# Patient Record
Sex: MALE | Race: BLACK OR AFRICAN AMERICAN | NOT HISPANIC OR LATINO | URBAN - METROPOLITAN AREA
[De-identification: names, ages, dates, MRNs, and addresses within clinical notes are randomized per-mention and may not be internally consistent; named-entity substitution may affect disease eponyms.]

---

## 2018-09-21 ENCOUNTER — EMERGENCY (EMERGENCY)
Facility: HOSPITAL | Age: 58
LOS: 1 days | End: 2018-09-21
Payer: SELF-PAY

## 2018-09-21 PROCEDURE — 70490 CT SOFT TISSUE NECK W/O DYE: CPT | Mod: 26

## 2018-09-21 PROCEDURE — 71046 X-RAY EXAM CHEST 2 VIEWS: CPT | Mod: 26

## 2018-09-21 PROCEDURE — 99284 EMERGENCY DEPT VISIT MOD MDM: CPT | Mod: 25

## 2018-09-21 PROCEDURE — 99053 MED SERV 10PM-8AM 24 HR FAC: CPT

## 2020-11-09 ENCOUNTER — EMERGENCY (EMERGENCY)
Facility: HOSPITAL | Age: 60
LOS: 1 days | Discharge: HOME | End: 2020-11-09
Attending: EMERGENCY MEDICINE
Payer: OTHER GOVERNMENT

## 2020-11-09 VITALS
SYSTOLIC BLOOD PRESSURE: 166 MMHG | HEIGHT: 75 IN | WEIGHT: 175.05 LBS | RESPIRATION RATE: 18 BRPM | DIASTOLIC BLOOD PRESSURE: 95 MMHG | HEART RATE: 82 BPM | TEMPERATURE: 98 F | OXYGEN SATURATION: 98 %

## 2020-11-09 DIAGNOSIS — F48.9 NONPSYCHOTIC MENTAL DISORDER, UNSPECIFIED: ICD-10-CM

## 2020-11-09 DIAGNOSIS — I10 ESSENTIAL (PRIMARY) HYPERTENSION: ICD-10-CM

## 2020-11-09 DIAGNOSIS — F32.9 MAJOR DEPRESSIVE DISORDER, SINGLE EPISODE, UNSPECIFIED: ICD-10-CM

## 2020-11-09 DIAGNOSIS — R94.31 ABNORMAL ELECTROCARDIOGRAM [ECG] [EKG]: ICD-10-CM

## 2020-11-09 DIAGNOSIS — F14.10 COCAINE ABUSE, UNCOMPLICATED: ICD-10-CM

## 2020-11-09 LAB
ALBUMIN SERPL ELPH-MCNC: 3.4 G/DL — SIGNIFICANT CHANGE UP (ref 3.3–5.2)
ALP SERPL-CCNC: 67 U/L — SIGNIFICANT CHANGE UP (ref 40–120)
ALT FLD-CCNC: 20 U/L — SIGNIFICANT CHANGE UP
AMPHET UR-MCNC: NEGATIVE — SIGNIFICANT CHANGE UP
ANION GAP SERPL CALC-SCNC: 9 MMOL/L — SIGNIFICANT CHANGE UP (ref 5–17)
APAP SERPL-MCNC: <3 UG/ML — LOW (ref 10–26)
APPEARANCE UR: CLEAR — SIGNIFICANT CHANGE UP
AST SERPL-CCNC: 21 U/L — SIGNIFICANT CHANGE UP
BACTERIA # UR AUTO: ABNORMAL
BARBITURATES UR SCN-MCNC: NEGATIVE — SIGNIFICANT CHANGE UP
BASOPHILS # BLD AUTO: 0.05 K/UL — SIGNIFICANT CHANGE UP (ref 0–0.2)
BASOPHILS NFR BLD AUTO: 1 % — SIGNIFICANT CHANGE UP (ref 0–2)
BENZODIAZ UR-MCNC: NEGATIVE — SIGNIFICANT CHANGE UP
BILIRUB SERPL-MCNC: 0.3 MG/DL — LOW (ref 0.4–2)
BILIRUB UR-MCNC: NEGATIVE — SIGNIFICANT CHANGE UP
BUN SERPL-MCNC: 20 MG/DL — SIGNIFICANT CHANGE UP (ref 8–20)
CALCIUM SERPL-MCNC: 8.4 MG/DL — LOW (ref 8.6–10.2)
CHLORIDE SERPL-SCNC: 101 MMOL/L — SIGNIFICANT CHANGE UP (ref 98–107)
CO2 SERPL-SCNC: 27 MMOL/L — SIGNIFICANT CHANGE UP (ref 22–29)
COCAINE METAB.OTHER UR-MCNC: POSITIVE
COLOR SPEC: YELLOW — SIGNIFICANT CHANGE UP
CREAT SERPL-MCNC: 1.61 MG/DL — HIGH (ref 0.5–1.3)
DIFF PNL FLD: NEGATIVE — SIGNIFICANT CHANGE UP
EOSINOPHIL # BLD AUTO: 0.14 K/UL — SIGNIFICANT CHANGE UP (ref 0–0.5)
EOSINOPHIL NFR BLD AUTO: 2.7 % — SIGNIFICANT CHANGE UP (ref 0–6)
EPI CELLS # UR: SIGNIFICANT CHANGE UP
ETHANOL SERPL-MCNC: <10 MG/DL — HIGH (ref 0–9)
GLUCOSE SERPL-MCNC: 97 MG/DL — SIGNIFICANT CHANGE UP (ref 70–99)
GLUCOSE UR QL: NEGATIVE MG/DL — SIGNIFICANT CHANGE UP
HCT VFR BLD CALC: 36.8 % — LOW (ref 39–50)
HGB BLD-MCNC: 11.6 G/DL — LOW (ref 13–17)
IMM GRANULOCYTES NFR BLD AUTO: 0.2 % — SIGNIFICANT CHANGE UP (ref 0–1.5)
KETONES UR-MCNC: NEGATIVE — SIGNIFICANT CHANGE UP
LEUKOCYTE ESTERASE UR-ACNC: NEGATIVE — SIGNIFICANT CHANGE UP
LYMPHOCYTES # BLD AUTO: 1.85 K/UL — SIGNIFICANT CHANGE UP (ref 1–3.3)
LYMPHOCYTES # BLD AUTO: 35.9 % — SIGNIFICANT CHANGE UP (ref 13–44)
MCHC RBC-ENTMCNC: 29.3 PG — SIGNIFICANT CHANGE UP (ref 27–34)
MCHC RBC-ENTMCNC: 31.5 GM/DL — LOW (ref 32–36)
MCV RBC AUTO: 92.9 FL — SIGNIFICANT CHANGE UP (ref 80–100)
METHADONE UR-MCNC: NEGATIVE — SIGNIFICANT CHANGE UP
MONOCYTES # BLD AUTO: 0.47 K/UL — SIGNIFICANT CHANGE UP (ref 0–0.9)
MONOCYTES NFR BLD AUTO: 9.1 % — SIGNIFICANT CHANGE UP (ref 2–14)
NEUTROPHILS # BLD AUTO: 2.64 K/UL — SIGNIFICANT CHANGE UP (ref 1.8–7.4)
NEUTROPHILS NFR BLD AUTO: 51.1 % — SIGNIFICANT CHANGE UP (ref 43–77)
NITRITE UR-MCNC: NEGATIVE — SIGNIFICANT CHANGE UP
OPIATES UR-MCNC: NEGATIVE — SIGNIFICANT CHANGE UP
PCP SPEC-MCNC: SIGNIFICANT CHANGE UP
PCP UR-MCNC: NEGATIVE — SIGNIFICANT CHANGE UP
PH UR: 6.5 — SIGNIFICANT CHANGE UP (ref 5–8)
PLATELET # BLD AUTO: 217 K/UL — SIGNIFICANT CHANGE UP (ref 150–400)
POTASSIUM SERPL-MCNC: 3.9 MMOL/L — SIGNIFICANT CHANGE UP (ref 3.5–5.3)
POTASSIUM SERPL-SCNC: 3.9 MMOL/L — SIGNIFICANT CHANGE UP (ref 3.5–5.3)
PROT SERPL-MCNC: 6.8 G/DL — SIGNIFICANT CHANGE UP (ref 6.6–8.7)
PROT UR-MCNC: 100 MG/DL
RBC # BLD: 3.96 M/UL — LOW (ref 4.2–5.8)
RBC # FLD: 13.1 % — SIGNIFICANT CHANGE UP (ref 10.3–14.5)
RBC CASTS # UR COMP ASSIST: SIGNIFICANT CHANGE UP /HPF (ref 0–4)
SALICYLATES SERPL-MCNC: <0.6 MG/DL — LOW (ref 10–20)
SARS-COV-2 RNA SPEC QL NAA+PROBE: SIGNIFICANT CHANGE UP
SODIUM SERPL-SCNC: 137 MMOL/L — SIGNIFICANT CHANGE UP (ref 135–145)
SP GR SPEC: 1.01 — SIGNIFICANT CHANGE UP (ref 1.01–1.02)
THC UR QL: NEGATIVE — SIGNIFICANT CHANGE UP
TROPONIN T SERPL-MCNC: 0.03 NG/ML — SIGNIFICANT CHANGE UP (ref 0–0.06)
UROBILINOGEN FLD QL: 1 MG/DL
WBC # BLD: 5.16 K/UL — SIGNIFICANT CHANGE UP (ref 3.8–10.5)
WBC # FLD AUTO: 5.16 K/UL — SIGNIFICANT CHANGE UP (ref 3.8–10.5)
WBC UR QL: SIGNIFICANT CHANGE UP

## 2020-11-09 PROCEDURE — 73562 X-RAY EXAM OF KNEE 3: CPT | Mod: 26,LT

## 2020-11-09 PROCEDURE — 90792 PSYCH DIAG EVAL W/MED SRVCS: CPT

## 2020-11-09 PROCEDURE — 99218: CPT

## 2020-11-09 RX ORDER — AMLODIPINE BESYLATE 2.5 MG/1
10 TABLET ORAL DAILY
Refills: 0 | Status: DISCONTINUED | OUTPATIENT
Start: 2020-11-09 | End: 2020-11-14

## 2020-11-09 RX ORDER — HYDRALAZINE HCL 50 MG
10 TABLET ORAL ONCE
Refills: 0 | Status: COMPLETED | OUTPATIENT
Start: 2020-11-09 | End: 2020-11-09

## 2020-11-09 RX ORDER — ASPIRIN/CALCIUM CARB/MAGNESIUM 324 MG
81 TABLET ORAL DAILY
Refills: 0 | Status: DISCONTINUED | OUTPATIENT
Start: 2020-11-09 | End: 2020-11-14

## 2020-11-09 RX ADMIN — Medication 10 MILLIGRAM(S): at 18:28

## 2020-11-09 RX ADMIN — Medication 81 MILLIGRAM(S): at 18:28

## 2020-11-09 RX ADMIN — AMLODIPINE BESYLATE 10 MILLIGRAM(S): 2.5 TABLET ORAL at 18:28
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
## 2020-11-09 NOTE — ED ADULT NURSE NOTE - NSSUHOSCREENINGYN_ED_ALL_ED
pt fell off couch around 10:30p. no vomiting. acting herself. Yes - the patient is able to be screened

## 2020-11-09 NOTE — CONSULT NOTE ADULT - ASSESSMENT
61 y/o male with PMHx of HTN presents to ED c/o suicidal thoughts.   Prior medical care/ psych care at [facility].

## 2020-11-09 NOTE — ED ADULT TRIAGE NOTE - CHIEF COMPLAINT QUOTE
Patient arrived to ED today with c/o suicidal thoughts.  Patient reports that he has been off his psych medication and is having thoughts and needs help.
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
## 2020-11-09 NOTE — ED STATDOCS - OBJECTIVE STATEMENT
61 y/o male with no PMHx presents to ED c/o suicidal thoughts. Patient hasn't taken his mental health medications and has been having suicidal racing thoughts for a few days and states he would love to stay here to get his mind right. Patient states difficulty accepting the death of his mom. Patient has also indulged in crack a few days ago. Denies: cp, palpitations, sob, fever, chills

## 2020-11-09 NOTE — ED CDU PROVIDER INITIAL DAY NOTE - OBJECTIVE STATEMENT
racing thoughts, suicidal thoughts for a few days.. admits noncompliance with medications and used crack cocaine 2 days ago.  Denies specific plan to harm self.  Denies chest pain, sob, palpitations.  Prior medical care/ psych care at [facility].

## 2020-11-09 NOTE — CONSULT NOTE ADULT - SUBJECTIVE AND OBJECTIVE BOX
[facility]                                                        Office: [address] [address]                                                       Telephone: [phone]. Fax:[phone]    CARDIOLOGY Note :  Medical Clearance for Behavioral health admission                                                                                             History obtained by: Patient and medical record   obtained: No    HPI: 61 y/o male with PMHx of HTN presents to ED c/o suicidal thoughts. Patient hasn't taken his mental health medications and has been having suicidal racing thoughts for a few days and states he would love to stay here to get his mind right. Patient states difficulty accepting the death of his mom. Patient has also indulged in crack two days ago. Denies specific plan to harm self.  Denies chest pain, sob, palpitations, fever, or chills    Prior medical care/ psych care at [facility].    REVIEW OF SYMPTOMS: Asymptomatic  Cardiovascular:  No chest pain,  No dyspnea,  No fatigue, No syncope,  No palpitations, No dizziness, No Orthopnea, No Paroxsymal nocturnal dyspnea.  Respiratory:  No Dyspnea, No cough.  Genitourinary:  No dysuria, no hematuria.  Gastrointestinal:  No nausea, no vomiting. No diarrhea.  No abdominal pain.  Neurological: No headache, no dizziness, no slurred speech.  Psychiatric: No agitation.  ALL OTHER REVIEW OF SYSTEMS ARE NEGATIVE.    Allergies  No Known Allergies    CURRENT MEDICATIONS:  amLODIPine   Tablet 10 milliGRAM(s) Oral daily  aspirin  chewable    Home Medications:  amLODIPine   Tablet 10 milliGRAM(s) Oral daily  Hydralazine     Past Medical History  Hypertension, unspecified type    Past Surgical History  No significant past surgical history    FAMILY HISTORY:  No pertinent past family Hx    Social History:  Smoker, alcohol and drugs abuse     Vital Signs Last 24 Hrs  T(C): 36.7 (2020 19:31), Max: 37 (2020 17:43)  T(F): 98 (2020 19:31), Max: 98.6 (2020 17:43)  HR: 70 (2020 19:31) (70 - 82)  BP: 169/93 (2020 19:31) (158/108 - 185/92)  RR: 20 (2020 19:31) (18 - 20)  SpO2: 100% (2020 19:31) (95% - 100%)    PHYSICAL EXAM:  Constitutional: Comfortable . No acute distress.   HEENT: Atraumatic and normcephalic , neck is supple . no JVD.  PEERL   CNS: A&O x3. No focal neurologic deficits.   Respiratory: CTAB. No wheezing, rhonchi or crackles   Cardiovascular: S1S2 RRR. No murmur or rubs  Gastrointestinal: Soft non-tender and non distended . +Bowel sounds.   Extremities: No pitting  edema x 4 extremities  Psychiatric: Calm . no agitation.    LABS:                        11.6   5.16  )-----------( 217      ( 2020 16:18 )             36.8         137  |  101  |  20.0  ----------------------------<  97  3.9   |  27.0  |  1.61<H>    Ca    8.4<L>      2020 16:18    TPro  6.8  /  Alb  3.4  /  TBili  0.3<L>  /  DBili  x   /  AST  21  /  ALT  20  /  AlkPhos  67  -    Urinalysis Basic - ( 2020 16:27 )  Color: Yellow / Appearance: Clear / S.010 / pH: x  Gluc: x / Ketone: Negative  / Bili: Negative / Urobili: 1 mg/dL   Blood: x / Protein: 100 mg/dL / Nitrite: Negative   Leuk Esterase: Negative / RBC: 0-2 /HPF / WBC 0-2   Sq Epi: x / Non Sq Epi: Occasional / Bacteria: Occasional    EKG: inverted T-waves I, II, III,AV F, V4-V6. Sinus rhythm. No ST abnormalities

## 2020-11-09 NOTE — ED ADULT NURSE NOTE - HPI (INCLUDE ILLNESS QUALITY, SEVERITY, DURATION, TIMING, CONTEXT, MODIFYING FACTORS, ASSOCIATED SIGNS AND SYMPTOMS)
Patient comes to  after being medically cleared in main ED. Reports he has not been "doing well". He is a  and receives meds and care from [facility]. States he has been vaguely suicidal lately, no plan or intent. Admits to recently using cocaine (crack), and states he feels like his meds aren't "helping very much". States he lives in [place], denied he is currently homeless despite history of being undomiciled.. Reports he has a court appearance in a couple of days. but that he "can't seem to clear" his mind. Agreed to stay in hospital overnight for further evaluation to determine appropriate disposition.

## 2020-11-09 NOTE — CONSULT NOTE ADULT - PROBLEM SELECTOR RECOMMENDATION 2
Patient noncompliant with medication  Continue Amlodipine and Hydralazine  Monitor BP  Dash diet      Case discussed with Dr Molina

## 2020-11-09 NOTE — ED BEHAVIORAL HEALTH ASSESSMENT NOTE - PATIENT'S CHIEF COMPLAINT
"My head is spinning with racing thoughts...I am not safe to be discharged.  I just need a couple of nights."

## 2020-11-09 NOTE — CONSULT NOTE ADULT - PROBLEM SELECTOR RECOMMENDATION 9
EKG: inverted T-waves I, II, III,AV F, V4-V6. Sinus rhythm. No ST abnormalities EKG: inverted T-waves I, II, III,AV F, V4-V6. Sinus rhythm. LVH strain   Patient currently without angina, dyspnea, syncope, palpitations however history of HTN.  From Cardiology standpoint patient EKG: inverted T-waves I, II, III,AV F, V4-V6. Sinus rhythm. LVH strain   Patient currently without angina, dyspnea, syncope, palpitations however history of HTN. Patient should get an Echo as outpatient.     From Cardiology standpoint patient is medically cleared for behavioral health admission EKG: inverted T-waves I, II, III,AV F, V4-V6. Sinus rhythm. LVH strain   Patient currently without angina, dyspnea, syncope, palpitations however history of HTN. Patient should get an Echo as outpatient.   Pt hemodynamically stable     From Cardiology standpoint patient is medically cleared for behavioral health admission

## 2020-11-09 NOTE — ED STATDOCS - MUSCULOSKELETAL, MLM
range of motion is not limited and there is no muscle tenderness. No pedal edema. left knee effusion with FROM.  No pedal edema.

## 2020-11-09 NOTE — CONSULT NOTE ADULT - ATTENDING COMMENTS
Discussed with NP   Asymptomatic   T wave changes with possible LVH on EKG   outpatient echo   ok from a cardiac standpoint to be admitted to psych unit If indicated

## 2020-11-09 NOTE — ED STATDOCS - PROGRESS NOTE DETAILS
seen by  and will need admission for  treatment.  Release of information sent to [facility] for old ECG.  will place on observation; pending medical clearance.

## 2020-11-09 NOTE — ED BEHAVIORAL HEALTH ASSESSMENT NOTE - DESCRIPTION
HTN, Arthritis , , < 5 yrs, no children,  ICU Vital Signs Last 24 Hrs  T(C): 36.7 (09 Nov 2020 13:31), Max: 36.7 (09 Nov 2020 13:31)  T(F): 98.1 (09 Nov 2020 13:31), Max: 98.1 (09 Nov 2020 13:31)  HR: 82 (09 Nov 2020 13:31) (82 - 82)  BP: 166/95 (09 Nov 2020 13:31) (166/95 - 166/95)  BP(mean): --  ABP: --  ABP(mean): --  RR: 18 (09 Nov 2020 13:31) (18 - 18)  SpO2: 98% (09 Nov 2020 13:31) (98% - 98%)

## 2020-11-10 VITALS
DIASTOLIC BLOOD PRESSURE: 91 MMHG | TEMPERATURE: 98 F | OXYGEN SATURATION: 100 % | RESPIRATION RATE: 18 BRPM | SYSTOLIC BLOOD PRESSURE: 154 MMHG | HEART RATE: 66 BPM

## 2020-11-10 PROCEDURE — 99217: CPT

## 2020-11-10 PROCEDURE — 80053 COMPREHEN METABOLIC PANEL: CPT

## 2020-11-10 PROCEDURE — U0003: CPT

## 2020-11-10 PROCEDURE — 99213 OFFICE O/P EST LOW 20 MIN: CPT

## 2020-11-10 PROCEDURE — G0378: CPT

## 2020-11-10 PROCEDURE — 85025 COMPLETE CBC W/AUTO DIFF WBC: CPT

## 2020-11-10 PROCEDURE — 99284 EMERGENCY DEPT VISIT MOD MDM: CPT | Mod: 25

## 2020-11-10 PROCEDURE — 84484 ASSAY OF TROPONIN QUANT: CPT

## 2020-11-10 PROCEDURE — 36415 COLL VENOUS BLD VENIPUNCTURE: CPT

## 2020-11-10 PROCEDURE — 81001 URINALYSIS AUTO W/SCOPE: CPT

## 2020-11-10 PROCEDURE — 73562 X-RAY EXAM OF KNEE 3: CPT

## 2020-11-10 PROCEDURE — 80307 DRUG TEST PRSMV CHEM ANLYZR: CPT

## 2020-11-10 RX ADMIN — Medication 81 MILLIGRAM(S): at 12:02

## 2020-11-10 RX ADMIN — AMLODIPINE BESYLATE 10 MILLIGRAM(S): 2.5 TABLET ORAL at 05:57

## 2020-11-10 NOTE — ED BEHAVIORAL HEALTH NOTE - BEHAVIORAL HEALTH NOTE
PROGRESS NOTE: 11-10-20 @ 08:43  	  • Reason for Ongoing Consultation: Depression and suicidal thoughts	    ID: [id number] Male with HEALTH ISSUES - PROBLEM Dx:  Hypertension, unspecified type    EKG abnormalities    Deferred condition on axis II    Cocaine abuse    Depressive disorder      INTERVAL DATA:   • Interval Chief Complaint: "I just need to get out of here."  • Interval History: 60 y.o. male with self reported history of PTSD, bipolar depression, and anxiety presented to ED last night c/o suicidal thoughts and worsening depression. Seen by the psychiatric NP provider last night. Upon reassessment this morning, patient is eager to leave, requesting breakfast as well as his medication. He states "I just needed a couple nights to get my composure. I was having unhealthy thoughts." He denies current suicidal or homicidal thoughts/intent/plan. He also denies auditory/visual hallucinations or delusions. Patient states that he used to take psych meds Mirtazapine, Quetiapine, and Prazosin; he stopped taking them several months ago because he "felt he could function without them." Patient expresses desire to see outpatient psychiatric provider for further management of depression. [facility] [place]; next appointment scheduled this 2020.     REVIEW OF SYSTEMS:   • Constitutional Symptoms	No complaints  • Eyes	No complaints  • Ears / Nose / Throat / Mouth	No complaints  • Cardiovascular	No complaints  • Respiratory	No complaints  • Gastrointestinal	No complaints  • Genitourinary	No complaints  • Musculoskeletal	Left knee pain secondary OA  • Skin	No complaints  • Neurological	No complaints  • Psychiatric (see HPI)	See HPI  • Endocrine	No complaints  • Hematologic / Lymphatic	No complaints  • Allergic / Immunologic	No complaints    REVIEW OF VITALS/LABS/IMAGING/INVESTIGATIONS:   • Vital signs reviewed: Yes  • Vital Signs:	    T(C): 36.4 (11-10-20 @ 03:43), Max: 37 (20 @ 17:43)  HR: 60 (11-10-20 @ 05:55) (56 - 82)  BP: 160/92 (11-10-20 @ 05:55) (139/77 - 185/92)  RR: 16 (11-10-20 @ 05:55) (16 - 20)  SpO2: 99% (11-10-20 @ 05:55) (95% - 100%)    • Available labs reviewed: Yes  • Available Lab Results:                           11.6   5.16  )-----------( 217      ( 2020 16:18 )             36.8         137  |  101  |  20.0  ----------------------------<  97  3.9   |  27.0  |  1.61<H>    Ca    8.4<L>      2020 16:18    TPro  6.8  /  Alb  3.4  /  TBili  0.3<L>  /  DBili  x   /  AST  21  /  ALT  20  /  AlkPhos  67  11    LIVER FUNCTIONS - ( 2020 16:18 )  Alb: 3.4 g/dL / Pro: 6.8 g/dL / ALK PHOS: 67 U/L / ALT: 20 U/L / AST: 21 U/L / GGT: x             Urinalysis Basic - ( 2020 16:27 )    Color: Yellow / Appearance: Clear / S.010 / pH: x  Gluc: x / Ketone: Negative  / Bili: Negative / Urobili: 1 mg/dL   Blood: x / Protein: 100 mg/dL / Nitrite: Negative   Leuk Esterase: Negative / RBC: 0-2 /HPF / WBC 0-2   Sq Epi: x / Non Sq Epi: Occasional / Bacteria: Occasional          MEDICATIONS:      PRN Medications: None  • PRN Medications since last evaluation: None	  • PRN Details: NA    Current Medications:   amLODIPine   Tablet 10 milliGRAM(s) Oral daily  aspirin  chewable 81 milliGRAM(s) Oral daily     Medication Side Effects:  • Medication Side Effects or Adverse Reactions (new or ongoing)	None known    MENTAL STATUS EXAM:   • Level of Consciousness	Alert  • General Appearance	Well developed  • Body Habitus	Well nourished  • Hygiene	Fair  • Grooming	Good  • Behavior	Cooperative  • Eye Contact	Good  • Relatedness	Good  • Impulse Control	Normal  • Muscle Tone / Strength	Normal muscle tone/strength  • Abnormal Movements	No abnormal movements  • Gait / Station	Antalgic gait  • Speech Volume	Normal  • Speech Rate	Normal  • Speech Spontaneity	Normal  • Speech Articulation	Normal  • Mood	Normal  • Affect Quality	Euthymic  • Affect Range	Full  • Affect Congruence	Congruent  • Thought Process	Linear  • Thought Associations	Normal  • Thought Content	Unremarkable  • Perceptions	No abnormalities  • Oriented to Time	Yes  • Oriented to Place	Yes  • Oriented to Situation	Yes  • Oriented to Person	Yes  • Attention / Concentration	Normal  • Estimated Intelligence	Average  • Recent Memory	Normal  • Remote Memory	Normal  • Fund of Knowledge	Normal  • Language	No abnormalities noted  • Judgment (regarding everyday events)	Fair  • Insight (regarding psychiatric illness)	Fair    SUICIDALITY:   • Suicidality (Interval)	none known    HOMICIDALITY/AGGRESSION:   • Homicidality/Aggression	none known    DIAGNOSIS DSM-V:    Psychiatric Diagnosis (Corresponds to DSM-IV Axis I, II):   HEALTH ISSUES - PROBLEM Dx:  Hypertension, unspecified type    EKG abnormalities    Deferred condition on axis II    Cocaine abuse    Depressive disorder       Medical Diagnosis (Corresponds to DSM-IV Axis III):  • Axis III	      ASSESSMENT OF CURRENT CONDITION:   Summary (include case differential, formulation and patient response to therapy): 60 y.o. male with history of PTSD, bipolar depression, and anxiety presented to ED last night for worsening depression and suicidality. Upon reassessment this morning, patient is cooperative and coherent. Denies current suicidal or homicidal thoughts/intent/plan, denies psychotic symptoms. Cleared by cardiology due to history of HTN. No psychiatric contraindications to discharge. Discussed with patient a plan to f[facility]u with [facility] in [place] for further management of depression; next appt scheduled 2020.     Risk Assessment (consider static vs modifiable risk factors and protective factors; comment on level of risk for dangerous behavior):  Patient denies current suicidal thoughts/intent/plan. No previous history of suicide attempts or SIB. Low level risk for dangerous behavior.    PLAN    Discharge patient from ED with outpatient psych f[facility] at the [facility] [place] [facility].

## 2020-11-10 NOTE — ED CDU PROVIDER DISPOSITION NOTE - CARE PROVIDER_API CALL
Juan Ramon Knight  ORTHOPAEDIC SURGERY  [address] [place] [address]  Phone: [phone]  Fax: [phone]  Follow Up Time: 4-6 Days

## 2020-11-10 NOTE — PROVIDER CONTACT NOTE (OTHER) - ASSESSMENT
Patient is medically cleared for discharge to home. MARILYNN Downey is requesting a Cardiology follow up appointment for patient. Case management office made aware for assist with the same.

## 2020-11-10 NOTE — ED CDU PROVIDER DISPOSITION NOTE - ATTENDING CONTRIBUTION TO CARE
I, Israel Lee, performed a face to face bedside interview with this patient regarding history of present illness, review of symptoms and relevant past medical, social and family history.  I completed an independent physical examination. I have communicated the patient’s plan of care and disposition with the ACP.      pt cleared by ;  avoid all drugs and alcohol   [facility]

## 2020-11-10 NOTE — ED ADULT NURSE REASSESSMENT NOTE - NS ED NURSE REASSESS COMMENT FT1
patient resting comfortably in stretcher, ambulated to bathroom independently at this time with steady gait. remains in yellow gown. VSS, patient safety maintained, will continue to monitor.
Assumed care of patient at 0720.  Patient resting in bed asleep with no distress noted.  Safety of patient maintained.
Patient ate 100% of his lunch.  Patient awaiting completion of discharge plans with social work.  Denies suicidal or homicidal ideations.  Safety of patient maintained.

## 2020-11-10 NOTE — ED CDU PROVIDER DISPOSITION NOTE - NSFOLLOWUPCLINICS_GEN_ALL_ED_FT
[facility]  Cardiology  [address] [place] [address]  Phone: [phone]  Fax:   Follow Up Time: 4-6 Days

## 2020-11-10 NOTE — ED CDU PROVIDER SUBSEQUENT DAY NOTE - ATTENDING CONTRIBUTION TO CARE
pt placed in observaton for reevaluation by ;  no complaints overnight;  pe awake alert in nad heent ncat neck supple cor s1 [id number] clear abd soft nontender neuor nonfocal

## 2020-11-10 NOTE — ED CDU PROVIDER SUBSEQUENT DAY NOTE - HISTORY
PT placed in OBS, has had no acute incidents or complaints while in CDU PT is stable. PT Placed in OBS for revelation by PSYC in the AM. PT placed in OBS, has had no acute incidents or complaints while in CDU PT is stable. PT Placed in OBS for reevaluation by PSYC in the AM.

## 2020-11-10 NOTE — ED CDU PROVIDER DISPOSITION NOTE - CLINICAL COURSE
Pt with racing thoughts, suicidal thoughts for a few days.. admits noncompliance with medications and used crack cocaine 2 days ago.  Denies specific plan to harm self.  Denies chest pain, sob, palpitations.  Prior medical care/ psych care at [facility]. Pt with abnormal EKG, cleared by cardiology.  Seen and cleared by psych. Discussed all incidental findings and importance of outpatient follow up.  Discussed results, plan and return precautions with patient, pt verbalized understanding and agreement of plan. Given copies of lab/radiology for outpatient follow up.

## 2020-11-10 NOTE — CHART NOTE - NSCHARTNOTEFT_GEN_A_CORE
SW Note: Met with pt to discuss his discharge plans. pt confirmed home address on face sheet, lives alone, apt. Stated he asked a friend to drive him to [facility] for help on 11/9/20. Pt mood was bright. Denied S/I and H/I. reports " feels better" and wants to go home. States [facility] the [facility]. Admits to recent poor compliance with aftercare but states he understands the importance of tx. declined to have SW call and confirm next appt. provided bus tickets and directions to get home. Reports no individuals in the community to outreach for D/C planning.   provided pt with [facility] crisis line and [facility] [place]

## 2020-11-10 NOTE — ED CDU PROVIDER DISPOSITION NOTE - NSFOLLOWUPINSTRUCTIONS_ED_ALL_ED_FT
- Please follow up with your Primary Care Doctor in 24-48 hr.  - Seek immediate medical care for any new, worsening or concerning signs or symptoms.   - You were given copies of all your test results, please bring to your primary care doctor for review of any abnormal test results  - Follow up with Cardiologist listed above, in 1 week for abnormal EKG  - Follow up with Orthopedist for knee pain    Feel better!

## 2020-11-10 NOTE — ED CDU PROVIDER DISPOSITION NOTE - PATIENT PORTAL LINK FT
You can access the FollowMyHealth Patient Portal offered by [facility] by registering at the following website: [email or web address]. By joining [employer]’s FollowMyHealth portal, you will also be able to view your health information using other applications (apps) compatible with our system.

## 2020-11-20 PROBLEM — Z00.00 ENCOUNTER FOR PREVENTIVE HEALTH EXAMINATION: Status: ACTIVE | Noted: 2020-11-20

## 2020-11-23 DIAGNOSIS — F14.90 COCAINE USE, UNSPECIFIED, UNCOMPLICATED: ICD-10-CM

## 2020-11-23 DIAGNOSIS — I10 ESSENTIAL (PRIMARY) HYPERTENSION: ICD-10-CM

## 2020-11-23 DIAGNOSIS — Z56.0 UNEMPLOYMENT, UNSPECIFIED: ICD-10-CM

## 2020-11-23 DIAGNOSIS — Z78.9 OTHER SPECIFIED HEALTH STATUS: ICD-10-CM

## 2020-11-23 DIAGNOSIS — Z63.5 DISRUPTION OF FAMILY BY SEPARATION AND DIVORCE: ICD-10-CM

## 2020-11-23 DIAGNOSIS — F17.200 NICOTINE DEPENDENCE, UNSPECIFIED, UNCOMPLICATED: ICD-10-CM

## 2020-11-23 DIAGNOSIS — B19.20 UNSPECIFIED VIRAL HEPATITIS C W/OUT HEPATIC COMA: ICD-10-CM

## 2020-11-23 RX ORDER — AMLODIPINE BESYLATE 10 MG/1
10 TABLET ORAL
Qty: 30 | Refills: 1 | Status: ACTIVE | COMMUNITY
Start: 2020-11-23

## 2020-11-23 RX ORDER — HYDROCHLOROTHIAZIDE 25 MG/1
25 TABLET ORAL
Qty: 90 | Refills: 2 | Status: ACTIVE | COMMUNITY
Start: 2020-11-23

## 2020-11-23 SDOH — ECONOMIC STABILITY - INCOME SECURITY: UNEMPLOYMENT, UNSPECIFIED: Z56.0

## 2020-11-23 SDOH — SOCIAL STABILITY - SOCIAL INSECURITY: DISRUPTION OF FAMILY BY SEPARATION AND DIVORCE: Z63.5

## 2020-11-25 ENCOUNTER — APPOINTMENT (OUTPATIENT)
Dept: CARDIOLOGY | Facility: CLINIC | Age: 60
End: 2020-11-25

## 2022-03-09 ENCOUNTER — INPATIENT (INPATIENT)
Facility: HOSPITAL | Age: 62
LOS: 6 days | Discharge: HOME | DRG: 917 | End: 2022-03-16
Attending: STUDENT IN AN ORGANIZED HEALTH CARE EDUCATION/TRAINING PROGRAM | Admitting: HOSPITALIST
Payer: OTHER GOVERNMENT

## 2022-03-09 VITALS
WEIGHT: 149.91 LBS | HEIGHT: 75 IN | TEMPERATURE: 97 F | HEART RATE: 80 BPM | OXYGEN SATURATION: 98 % | RESPIRATION RATE: 18 BRPM | SYSTOLIC BLOOD PRESSURE: 231 MMHG | DIASTOLIC BLOOD PRESSURE: 145 MMHG

## 2022-03-09 DIAGNOSIS — R77.8 OTHER SPECIFIED ABNORMALITIES OF PLASMA PROTEINS: ICD-10-CM

## 2022-03-09 DIAGNOSIS — I10 ESSENTIAL (PRIMARY) HYPERTENSION: ICD-10-CM

## 2022-03-09 DIAGNOSIS — F14.921 COCAINE USE, UNSPECIFIED WITH INTOXICATION DELIRIUM: ICD-10-CM

## 2022-03-09 LAB
ALBUMIN SERPL ELPH-MCNC: 4.2 G/DL — SIGNIFICANT CHANGE UP (ref 3.3–5.2)
ALP SERPL-CCNC: 97 U/L — SIGNIFICANT CHANGE UP (ref 40–120)
ALT FLD-CCNC: 24 U/L — SIGNIFICANT CHANGE UP
AMPHET UR-MCNC: NEGATIVE — SIGNIFICANT CHANGE UP
ANION GAP SERPL CALC-SCNC: 11 MMOL/L — SIGNIFICANT CHANGE UP (ref 5–17)
APAP SERPL-MCNC: <3 UG/ML — LOW (ref 10–26)
APPEARANCE UR: CLEAR — SIGNIFICANT CHANGE UP
APTT BLD: 33.6 SEC — SIGNIFICANT CHANGE UP (ref 27.5–35.5)
AST SERPL-CCNC: 43 U/L — HIGH
BACTERIA # UR AUTO: ABNORMAL
BARBITURATES UR SCN-MCNC: NEGATIVE — SIGNIFICANT CHANGE UP
BASOPHILS # BLD AUTO: 0.05 K/UL — SIGNIFICANT CHANGE UP (ref 0–0.2)
BASOPHILS NFR BLD AUTO: 1.3 % — SIGNIFICANT CHANGE UP (ref 0–2)
BENZODIAZ UR-MCNC: NEGATIVE — SIGNIFICANT CHANGE UP
BILIRUB SERPL-MCNC: 0.5 MG/DL — SIGNIFICANT CHANGE UP (ref 0.4–2)
BILIRUB UR-MCNC: NEGATIVE — SIGNIFICANT CHANGE UP
BUN SERPL-MCNC: 28.3 MG/DL — HIGH (ref 8–20)
CALCIUM SERPL-MCNC: 9.2 MG/DL — SIGNIFICANT CHANGE UP (ref 8.6–10.2)
CHLORIDE SERPL-SCNC: 102 MMOL/L — SIGNIFICANT CHANGE UP (ref 98–107)
CO2 SERPL-SCNC: 26 MMOL/L — SIGNIFICANT CHANGE UP (ref 22–29)
COCAINE METAB.OTHER UR-MCNC: POSITIVE
COLOR SPEC: YELLOW — SIGNIFICANT CHANGE UP
CREAT SERPL-MCNC: 2.05 MG/DL — HIGH (ref 0.5–1.3)
DIFF PNL FLD: ABNORMAL
EGFR: 36 ML/MIN/1.73M2 — LOW
EOSINOPHIL # BLD AUTO: 0.08 K/UL — SIGNIFICANT CHANGE UP (ref 0–0.5)
EOSINOPHIL NFR BLD AUTO: 2.1 % — SIGNIFICANT CHANGE UP (ref 0–6)
EPI CELLS # UR: SIGNIFICANT CHANGE UP
ETHANOL SERPL-MCNC: <10 MG/DL — SIGNIFICANT CHANGE UP (ref 0–9)
GLUCOSE SERPL-MCNC: 100 MG/DL — HIGH (ref 70–99)
GLUCOSE UR QL: NEGATIVE MG/DL — SIGNIFICANT CHANGE UP
HCT VFR BLD CALC: 45.1 % — SIGNIFICANT CHANGE UP (ref 39–50)
HGB BLD-MCNC: 13.9 G/DL — SIGNIFICANT CHANGE UP (ref 13–17)
HIV 1 & 2 AB SERPL IA.RAPID: SIGNIFICANT CHANGE UP
IMM GRANULOCYTES NFR BLD AUTO: 0 % — SIGNIFICANT CHANGE UP (ref 0–1.5)
INR BLD: 1.02 RATIO — SIGNIFICANT CHANGE UP (ref 0.88–1.16)
KETONES UR-MCNC: NEGATIVE — SIGNIFICANT CHANGE UP
LEUKOCYTE ESTERASE UR-ACNC: NEGATIVE — SIGNIFICANT CHANGE UP
LYMPHOCYTES # BLD AUTO: 0.99 K/UL — LOW (ref 1–3.3)
LYMPHOCYTES # BLD AUTO: 25.6 % — SIGNIFICANT CHANGE UP (ref 13–44)
MCHC RBC-ENTMCNC: 28.4 PG — SIGNIFICANT CHANGE UP (ref 27–34)
MCHC RBC-ENTMCNC: 30.8 GM/DL — LOW (ref 32–36)
MCV RBC AUTO: 92.2 FL — SIGNIFICANT CHANGE UP (ref 80–100)
METHADONE UR-MCNC: NEGATIVE — SIGNIFICANT CHANGE UP
MONOCYTES # BLD AUTO: 0.25 K/UL — SIGNIFICANT CHANGE UP (ref 0–0.9)
MONOCYTES NFR BLD AUTO: 6.5 % — SIGNIFICANT CHANGE UP (ref 2–14)
NEUTROPHILS # BLD AUTO: 2.5 K/UL — SIGNIFICANT CHANGE UP (ref 1.8–7.4)
NEUTROPHILS NFR BLD AUTO: 64.5 % — SIGNIFICANT CHANGE UP (ref 43–77)
NITRITE UR-MCNC: NEGATIVE — SIGNIFICANT CHANGE UP
OPIATES UR-MCNC: NEGATIVE — SIGNIFICANT CHANGE UP
PCP SPEC-MCNC: SIGNIFICANT CHANGE UP
PCP UR-MCNC: NEGATIVE — SIGNIFICANT CHANGE UP
PH UR: 7 — SIGNIFICANT CHANGE UP (ref 5–8)
PLATELET # BLD AUTO: 216 K/UL — SIGNIFICANT CHANGE UP (ref 150–400)
POTASSIUM SERPL-MCNC: 5.2 MMOL/L — SIGNIFICANT CHANGE UP (ref 3.5–5.3)
POTASSIUM SERPL-SCNC: 5.2 MMOL/L — SIGNIFICANT CHANGE UP (ref 3.5–5.3)
PROT SERPL-MCNC: 8.8 G/DL — HIGH (ref 6.6–8.7)
PROT UR-MCNC: 100
PROTHROM AB SERPL-ACNC: 11.8 SEC — SIGNIFICANT CHANGE UP (ref 10.5–13.4)
RBC # BLD: 4.89 M/UL — SIGNIFICANT CHANGE UP (ref 4.2–5.8)
RBC # FLD: 14.1 % — SIGNIFICANT CHANGE UP (ref 10.3–14.5)
RBC CASTS # UR COMP ASSIST: SIGNIFICANT CHANGE UP /HPF (ref 0–4)
SALICYLATES SERPL-MCNC: <0.6 MG/DL — LOW (ref 10–20)
SARS-COV-2 RNA SPEC QL NAA+PROBE: SIGNIFICANT CHANGE UP
SODIUM SERPL-SCNC: 139 MMOL/L — SIGNIFICANT CHANGE UP (ref 135–145)
SP GR SPEC: 1.01 — SIGNIFICANT CHANGE UP (ref 1.01–1.02)
THC UR QL: POSITIVE
TROPONIN T SERPL-MCNC: 0.06 NG/ML — SIGNIFICANT CHANGE UP (ref 0–0.06)
TROPONIN T SERPL-MCNC: 0.08 NG/ML — HIGH (ref 0–0.06)
UROBILINOGEN FLD QL: NEGATIVE MG/DL — SIGNIFICANT CHANGE UP
WBC # BLD: 3.87 K/UL — SIGNIFICANT CHANGE UP (ref 3.8–10.5)
WBC # FLD AUTO: 3.87 K/UL — SIGNIFICANT CHANGE UP (ref 3.8–10.5)
WBC UR QL: SIGNIFICANT CHANGE UP /HPF (ref 0–5)

## 2022-03-09 PROCEDURE — 99220: CPT

## 2022-03-09 RX ORDER — SODIUM CHLORIDE 9 MG/ML
500 INJECTION INTRAMUSCULAR; INTRAVENOUS; SUBCUTANEOUS ONCE
Refills: 0 | Status: COMPLETED | OUTPATIENT
Start: 2022-03-09 | End: 2022-03-09

## 2022-03-09 RX ORDER — HYDRALAZINE HCL 50 MG
10 TABLET ORAL ONCE
Refills: 0 | Status: COMPLETED | OUTPATIENT
Start: 2022-03-09 | End: 2022-03-09

## 2022-03-09 RX ORDER — SODIUM CHLORIDE 9 MG/ML
1000 INJECTION INTRAMUSCULAR; INTRAVENOUS; SUBCUTANEOUS ONCE
Refills: 0 | Status: DISCONTINUED | OUTPATIENT
Start: 2022-03-09 | End: 2022-03-09

## 2022-03-09 RX ADMIN — SODIUM CHLORIDE 500 MILLILITER(S): 9 INJECTION INTRAMUSCULAR; INTRAVENOUS; SUBCUTANEOUS at 23:00

## 2022-03-09 RX ADMIN — Medication 10 MILLIGRAM(S): at 20:44

## 2022-03-09 RX ADMIN — Medication 1 MILLIGRAM(S): at 14:39

## 2022-03-09 RX ADMIN — SODIUM CHLORIDE 500 MILLILITER(S): 9 INJECTION INTRAMUSCULAR; INTRAVENOUS; SUBCUTANEOUS at 17:44

## 2022-03-09 NOTE — CONSULT NOTE ADULT - PROBLEM SELECTOR RECOMMENDATION 9
.  - patient admitted to cocaine use in past 24H  - Utox + for THC, Cocaine  - troponin elevated 0.08,   - EKG with nonspecific T wave abnormality  - BNP elevated 4482  - continue to trend troponin  - unable to assess for chest pain due to patient's LOC  - pending TTE for evaluation of cardiac function and any valvular abnormalities

## 2022-03-09 NOTE — ED PROVIDER NOTE - ATTENDING CONTRIBUTION TO CARE
61yoM; with PMH signif for PTSD, Bipolar, Anxiety; now p/w not wanting to live 2/2 using drugs too much. states he has no plan, but no longer wants to live because he uses crack too much and is destroying his life. patient states it was snowing outside so he felt like it was a good time to come get help.  denies hi/ah/vh. denies cp/sob/palp. denies cough. denies abd pain. denies n/v/d. denies headache. denies numbness/tingling. denies focal weakness. denies blurry vision. in triage, found to be severely hypertensive.  Gen: Alert, NAD  Head: NC, AT, PERRL, EOMI, normal lids/conjunctiva  Neck: +supple, no tenderness/meningismus/JVD, +Trachea midline  Pulm: Bilateral BS, normal resp effort, no wheeze/stridor/retractions  CV: RRR, no M/R/G, 2+dist pulses  Abd: soft, NT/ND, +BS, no hepatosplenomegaly  Mskel: ROM intact x4 extremities.  no edema/erythema/cyanosis  Skin: no rash, warm, dry  Neuro: AAOx3, no sensory/motor deficits, CN 2-12 intact  A/P: 61yoM p/w hypertensive emergency & passive SI  -Passive SI/Drug abuse: psych eval, sw for sbirt  -Hypertensive urgency: labs, ekg, cardiac monitor, cardiology consult.

## 2022-03-09 NOTE — ED PROVIDER NOTE - CLINICAL SUMMARY MEDICAL DECISION MAKING FREE TEXT BOX
62 yo male with thoughts of suicidality. Found to be hypertensive yesterday pm after smoking cocaine. Will administer 1 mg of Ativan. Pursue cardiac workup and check ED Psych labs. Consult  for further evaluation. Monitor and reassess.

## 2022-03-09 NOTE — CONSULT NOTE ADULT - ATTENDING COMMENTS
Patient received ativan prior to my evaluation, lethargic and unable to provide detailed history    60 y/o M presents with suicidal ideation   +Recent cocaine and marijuana use   EKG shows SR with anterolateral TWI, similar to previous EKG from 11/2020  Troponin 0.08, . Cr 2.05  Trend cardiac enzymes and EKGs   TTE for LVEF, wall motion abnormalities  Further ischemic workup, likely with lexiscan NST, pending patient agreement.   Further management as above
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
## 2022-03-09 NOTE — ED BEHAVIORAL HEALTH ASSESSMENT NOTE - RISK ASSESSMENT
Addended by: Nathan [place] on: 6/26/2020 09:51 AM     Modules accepted: Mick Risk factors: depression, impulsivity, hx of self- injurious behavior, prior suicidality,  prior hospitalizations, positive family hx, hx of substance abuse, multiple stressors,  poor reactivity to stressors, hx of trauma   Protective factors: unable to assess    Based on risk assessment evaluation, Pt DOES NOT appear to be at imminent risk of harm to self or others at this time. Moderate Acute Suicide Risk

## 2022-03-09 NOTE — ED BEHAVIORAL HEALTH ASSESSMENT NOTE - HPI (INCLUDE ILLNESS QUALITY, SEVERITY, DURATION, TIMING, CONTEXT, MODIFYING FACTORS, ASSOCIATED SIGNS AND SYMPTOMS)
Patient is a 61 year old male, , domiciled, non-care giver, presents to [facility] for c/o depression with thoughts of hurting himself. Admits to crack and marijuana use yesterday.    Patient was seen and evaluated, the chart was reviewed, treatment plan discussed with the team. As per nursing no behavioral events/abnormalities, pt is has been asleep. Pt was seen at bedside, upon assessment pt is asleep, lethargic, difficult to arouse, he awakens to painful stimuli, he is unable to keep his eyes open. He is unable to participate in MSE related to lethargy. No overt symptoms of psychosis.

## 2022-03-09 NOTE — ED PROVIDER NOTE - OBJECTIVE STATEMENT
60 yo male history of PTSD, bipolar depression, and anxiety presents stating that he is experiencing thoughts of wanting to end his life. He states that he is tired of his life. Patient does not endorse any specific plan with which to end his life. Patient states that he last snorted cocaine yesterday, he has snorted cocaine in the past. Besides cocaine, patient also uses marijuana. Denies other drug or alcohol use. Denies fever/chills, cough, sore throat, sob, cp, abd pain, n/v/d, urinary symptoms, dizziness, headache, focal weakness/numbness/tingling in extremities.

## 2022-03-09 NOTE — ED PROVIDER NOTE - PHYSICAL EXAMINATION
Gen: NAD.   Head: NC/AT  Neck: trachea midline  Resp:  No distress, lungs cta  Cardiac: Heart RRR. No LE edema. Hypertensive.   Abdomen: Soft, nondistended.   Ext: no deformities  Neuro:  A&O appears non focal  Skin:  Warm and dry as visualized  Psych:  Endorses SI.

## 2022-03-09 NOTE — CONSULT NOTE ADULT - SUBJECTIVE AND OBJECTIVE BOX
[facility]                                              CARDIOLOGY AT [place] [address]                                             Telephone: [phone]. Fax:[phone]                                                       CARDIOLOGY CONSULTATION NOTE                                                                                             History obtained by:  medical record  [facility], later [facility] Cardiologist: unknown    obtained: Yes [  ] No [  x]  Reason for Consultation: elevated troponin  Available out pt records reviewed: Yes [x  ] No [  ]    Chief complaint:    Patient is a 61y old  Male who presents with a chief complaint of suicidal ideation    HPI: 62 y/o M with PMH HTN, PTSD, Bipolar depression, anxiety, Hep C (?) presents to [facility] with suicidal ideation. Patient also reports having used cocaine and marijuana in the past day. Besides cocaine, patient also uses marijuana. Denies other drug or alcohol use. Denies fever/chills, cough, sore throat, sob, cp, abd pain, n/v/d, urinary symptoms, dizziness, headache, focal weakness/numbness/tingling in extremities. HPI obtained from records due to patient lethargy after administration of ativan.    CARDIAC TESTING   ECHO:  STRESS:  CATH:   ELECTROPHYSIOLOGY:     PAST MEDICAL HISTORY  No pertinent past medical history  Hypertension, unspecified type    PAST SURGICAL HISTORY  No significant past surgical history    SOCIAL HISTORY:   CIGARETTES:   n/a  ALCOHOL: n/a  DRUGS: cocaine, marijuana use    FAMILY HISTORY:  No pertinent family history in first degree relatives      Family History of Cardiovascular Disease:  unknown  Coronary Artery Disease in first degree relative: unknown  Sudden Cardiac Death in First degree relative: unknown    HOME MEDICATIONS:    CURRENT CARDIAC MEDICATIONS:  Amlodipine 10mg PO daily  hydrochlorothiazide 25mg PO daily    CURRENT OTHER MEDICATIONS:  sodium chloride 0.9% Bolus 500 milliLiter(s) IV Bolus once, Stop order after: 1 Doses      ALLERGIES:   No Known Allergies      REVIEW OF SYMPTOMS:   Patient is lethargic after ativan administration and  cannot participate in exam.      VITAL SIGNS:  T(C): 36.1 (22 @ 13:33), Max: 36.1 (22 @ 13:33)  T(F): 97 (22 @ 13:33), Max: 97 (22 @ 13:33)  HR: 88 (22 @ 15:08) (80 - 88)  BP: 218/101 (22 @ 15:08) (218/101 - 231/145)  RR: 18 (22 @ 13:33) (18 - 18)  SpO2: 98% (22 @ 13:33) (98% - 98%)    INTAKE AND OUTPUT:       PHYSICAL EXAM:  Constitutional: Comfortable . No acute distress.   HEENT: Atraumatic and normocephalic , neck is supple . no JVD. No carotid bruit.  CNS: A&Ox3. No focal deficits.   Respiratory: CTAB, unlabored   Cardiovascular: RRR normal s1 s2. No murmur. No rubs or gallop.  Gastrointestinal: Soft, non-tender. +Bowel sounds.   Extremities: 2+ Peripheral Pulses, No clubbing, cyanosis, or edema  Psychiatric: Calm . no agitation.   Skin: Warm and dry, no ulcers on extremities     LABS:  ( 09 Mar 2022 14:42 )  Troponin T  0.08<H>,  CPK  538<H>, CKMB  X    , BNP 4882<H>                              13.9   3.87  )-----------( 216      ( 09 Mar 2022 14:42 )             45.1     03-    139  |  102  |  28.3<H>  ----------------------------<  100<H>  5.2   |  26.0  |  2.05<H>    Ca    9.2      09 Mar 2022 14:42  Mg     2.2     -    TPro  8.8<H>  /  Alb  4.2  /  TBili  0.5  /  DBili  x   /  AST  43<H>  /  ALT  24  /  AlkPhos  97  03-09    PT/INR - ( 09 Mar 2022 14:42 )   PT: 11.8 sec;   INR: 1.02 ratio         PTT - ( 09 Mar 2022 14:42 )  PTT:33.6 sec  Urinalysis Basic - ( 09 Mar 2022 15:06 )    Color: Yellow / Appearance: Clear / S.010 / pH: x  Gluc: x / Ketone: Negative  / Bili: Negative / Urobili: Negative mg/dL   Blood: x / Protein: 100 / Nitrite: Negative   Leuk Esterase: Negative / RBC: 0-2 /HPF / WBC 0-2 /HPF   Sq Epi: x / Non Sq Epi: Occasional / Bacteria: Occasional    INTERPRETATION OF TELEMETRY:     ECG: NSR with nonspecific T wave abnormality  Prior ECG: Yes [x ] No [  ]    RADIOLOGY & ADDITIONAL STUDIES:    X-ray:    CT scan:   MRI:   US:

## 2022-03-09 NOTE — CONSULT NOTE ADULT - PROBLEM SELECTOR RECOMMENDATION 2
.  - SBP on admission in 200s  - given ativan for delirium and HTN  - restart amlodipine 10mg PO daily  - hold HCTZ for Cr > 2

## 2022-03-09 NOTE — ED BEHAVIORAL HEALTH ASSESSMENT NOTE - DESCRIPTION
ICU Vital Signs Last 24 Hrs  T(C): 36.1 (09 Mar 2022 13:33), Max: 36.1 (09 Mar 2022 13:33)  T(F): 97 (09 Mar 2022 13:33), Max: 97 (09 Mar 2022 13:33)  HR: 88 (09 Mar 2022 15:08) (80 - 88)  BP: 218/101 (09 Mar 2022 15:08) (218/101 - 231/145)  RR: 18 (09 Mar 2022 13:33) (18 - 18)  SpO2: 98% (09 Mar 2022 13:33) (98% - 98%)  03-09    139  |  102  |  28.3<H>  ----------------------------<  100<H>  5.2   |  26.0  |  2.05<H>    Ca    9.2      09 Mar 2022 14:42  Mg     2.2     03-09    TPro  8.8<H>  /  Alb  4.2  /  TBili  0.5  /  DBili  x   /  AST  43<H>  /  ALT  24  /  AlkPhos  97  03-09  Cocaine Metabolite, Urine: Positive (03.09.22 @ 15:05)  THC, Urine Qualitative: Positive (03.09.22 @ 15:05) , , < 5 yrs, no children,  HTN, Arthritis

## 2022-03-09 NOTE — ED ADULT TRIAGE NOTE - NS_BH TRG QUESTION4_ED_ALL_ED
Patient resting calmly, no complaints, no distress. Continued video monitoring in place, sitter at bedside. No

## 2022-03-09 NOTE — ED CDU PROVIDER INITIAL DAY NOTE - ATTENDING CONTRIBUTION TO CARE
61yoM; with PMH signif for PTSD, Bipolar, Anxiety; now p/w not wanting to live 2/2 using drugs too much. states he has no plan, but no longer wants to live because he uses crack too much and is destroying his life. patient states it was snowing outside so he felt like it was a good time to come get help.  denies hi/ah/vh. denies cp/sob/palp. denies cough. denies abd pain. denies n/v/d. denies headache. denies numbness/tingling. denies focal weakness. denies blurry vision. in triage, found to be severely hypertensive.  Gen: Alert, NAD  Head: NC, AT, PERRL, EOMI, normal lids/conjunctiva  Neck: +supple, no tenderness/meningismus/JVD, +Trachea midline  Pulm: Bilateral BS, normal resp effort, no wheeze/stridor/retractions  CV: RRR, no M/R/G, 2+dist pulses  Abd: soft, NT/ND, +BS, no hepatosplenomegaly  Mskel: ROM intact x4 extremities.  no edema/erythema/cyanosis  Skin: no rash, warm, dry  Neuro: AAOx3, no sensory/motor deficits, CN 2-12 intact  A/P: 61yoM p/w hypertensive emergency & passive SI  -Passive SI/Drug abuse: psych re-eval in AM, sw for sbirt  -Hypertensive urgency: echo, nuclear stress

## 2022-03-09 NOTE — ED PROVIDER NOTE - PROGRESS NOTE DETAILS
Darrell: Troponin trending downward from 0.08 to 0.06. Will trend Troponin, Echo and Nuclear stress test per cardiology. Hydralazine for BP control. Plan for virtual observation.

## 2022-03-09 NOTE — CONSULT NOTE ADULT - ASSESSMENT
62 y/o M with PMH HTN, PTSD, Bipolar depression, anxiety, Hep C (?) presents to [facility] with suicidal ideation. Patient also reports having used cocaine and marijuana in the past day. Besides cocaine, patient also uses marijuana. Denies other drug or alcohol use. Denies fever/chills, cough, sore throat, sob, cp, abd pain, n/v/d, urinary symptoms, dizziness, headache, focal weakness/numbness/tingling in extremities. HPI obtained from records due to patient lethargy after administration of ativan.

## 2022-03-09 NOTE — ED ADULT TRIAGE NOTE - CHIEF COMPLAINT QUOTE
PT presents to ED for depression.  States he has thoughts of hurting himself but denies plan. Admits to crack and marijuana use yesterday. Denies drugs or ETOH today

## 2022-03-09 NOTE — ED CDU PROVIDER INITIAL DAY NOTE - MEDICAL DECISION MAKING DETAILS
60 yo male with thoughts of suicidality. Found to be hypertensive yesterday pm after smoking cocaine. Initial Troponin .08, now downtrending to .06. Cardiology saw patient, patient is pending echo and stress test. Patient pending  evaluation at this time for depression/suicidality.

## 2022-03-10 PROCEDURE — 99226: CPT

## 2022-03-10 RX ORDER — HYDRALAZINE HCL 50 MG
10 TABLET ORAL ONCE
Refills: 0 | Status: COMPLETED | OUTPATIENT
Start: 2022-03-10 | End: 2022-03-10

## 2022-03-10 RX ORDER — HYDRALAZINE HCL 50 MG
25 TABLET ORAL ONCE
Refills: 0 | Status: COMPLETED | OUTPATIENT
Start: 2022-03-10 | End: 2022-03-10

## 2022-03-10 RX ORDER — AMLODIPINE BESYLATE 2.5 MG/1
10 TABLET ORAL DAILY
Refills: 0 | Status: DISCONTINUED | OUTPATIENT
Start: 2022-03-10 | End: 2022-03-16

## 2022-03-10 RX ORDER — HYDRALAZINE HCL 50 MG
25 TABLET ORAL
Refills: 0 | Status: DISCONTINUED | OUTPATIENT
Start: 2022-03-10 | End: 2022-03-11

## 2022-03-10 RX ADMIN — AMLODIPINE BESYLATE 10 MILLIGRAM(S): 2.5 TABLET ORAL at 13:19

## 2022-03-10 RX ADMIN — Medication 10 MILLIGRAM(S): at 03:28

## 2022-03-10 RX ADMIN — Medication 25 MILLIGRAM(S): at 08:35

## 2022-03-10 RX ADMIN — Medication 25 MILLIGRAM(S): at 18:50

## 2022-03-10 NOTE — PROGRESS NOTE ADULT - ASSESSMENT
62 yo male history of PTSD, bipolar depression, and anxiety presents stating that he is experiencing thoughts of wanting to end his life. He states that he is tired of his life. Patient does not endorse any specific plan with which to end his life. Patient states that he last snorted cocaine yesterday, he has snorted cocaine in the past. Besides cocaine, patient also uses marijuana. Denies other drug or alcohol use. Denies fever/chills, cough, sore throat, sob, cp, abd pain, n/v/d, urinary symptoms, dizziness, headache, focal weakness/numbness/tingling in extremities. Pt found to have increased troponin, which is now down trending and also has EKG with T wave changes. He refused stress test today, he states his reasoning is "the test is too long", but is agreeable to have the test done tomorrow.

## 2022-03-10 NOTE — PROGRESS NOTE ADULT - PROBLEM SELECTOR PLAN 2
- Troponin down trending  - Denies CP or SOB  - Pt came to ED yesterday with cocaine intoxication- BB deferred   - Scheduled for stress test tomorrow 1st case  - Pt had runs of NSVT, longest run 15 beats, remains in NSR  - Keep K >4, Mg >2 to for arrhythmia ppx  -Continue telemetry monitoring - Troponin down trending  - Denies CP or SOB  - TTE performed, pending read  - Pt came to ED yesterday with cocaine intoxication- BB deferred   - Scheduled for stress test tomorrow 1st case  - Pt had runs of NSVT, longest run 15 beats, remains in NSR  - Keep K >4, Mg >2 to for arrhythmia ppx  -Continue telemetry monitoring

## 2022-03-10 NOTE — ED BEHAVIORAL HEALTH NOTE - BEHAVIORAL HEALTH NOTE
PROGRESS NOTE: 03-10-22 @ 10:26  	  • Reason for Ongoing Consultation: Depression	    ID: 61yyo Male with HEALTH ISSUES - PROBLEM Dx:  Intoxication with cocaine, with delirium  Hypertension, unspecified type  Elevated troponin  Marijuana use         INTERVAL DATA:   • Interval Chief Complaint: "I want to eat"  • Interval History:   Chart reviewed and patient seen. Patient extremely uncooperative and refused to speak with me. Was preoccupied with getting to eat, which nurse is aware of. Unable to assess at this time.     REVIEW OF SYSTEMS:   • Constitutional Symptoms	No complaints  • Eyes	No complaints  • Ears / Nose / Throat / Mouth	No complaints  • Cardiovascular	No complaints  • Respiratory	No complaints  • Gastrointestinal	No complaints  • Genitourinary	No complaints  • Musculoskeletal	No complaints  • Skin	No complaints  • Neurological	No complaints  • Psychiatric (see HPI)	See HPI  • Endocrine	No complaints  • Hematologic / Lymphatic	No complaints  • Allergic / Immunologic	No complaints    REVIEW OF VITALS/LABS/IMAGING/INVESTIGATIONS:   • Vital signs reviewed: Yes  • Vital Signs:	    T(C): 36.9 (22 @ 23:19), Max: 36.9 (22 @ 23:19)  HR: 74 (03-10-22 @ 06:33) (64 - 88)  BP: 162/96 (03-10-22 @ 06:33) (160/96 - 231/145)  RR: 15 (03-10-22 @ 04:03) (15 - 18)  SpO2: 98% (03-10-22 @ 04:03) (98% - 100%)    • Available labs reviewed: Yes  • Available Lab Results:                           13.9   3.87  )-----------( 216      ( 09 Mar 2022 14:42 )             45.1         139  |  102  |  28.3<H>  ----------------------------<  100<H>  5.2   |  26.0  |  2.05<H>    Ca    9.2      09 Mar 2022 14:42  Mg     2.2         TPro  8.8<H>  /  Alb  4.2  /  TBili  0.5  /  DBili  x   /  AST  43<H>  /  ALT  24  /  AlkPhos  97      LIVER FUNCTIONS - ( 09 Mar 2022 14:42 )  Alb: 4.2 g/dL / Pro: 8.8 g/dL / ALK PHOS: 97 U/L / ALT: 24 U/L / AST: 43 U/L / GGT: x           PT/INR - ( 09 Mar 2022 14:42 )   PT: 11.8 sec;   INR: 1.02 ratio         PTT - ( 09 Mar 2022 14:42 )  PTT:33.6 sec  Urinalysis Basic - ( 09 Mar 2022 15:06 )    Color: Yellow / Appearance: Clear / S.010 / pH: x  Gluc: x / Ketone: Negative  / Bili: Negative / Urobili: Negative mg/dL   Blood: x / Protein: 100 / Nitrite: Negative   Leuk Esterase: Negative / RBC: 0-2 /HPF / WBC 0-2 /HPF   Sq Epi: x / Non Sq Epi: Occasional / Bacteria: Occasional    Cocaine Metabolite, Urine: Positive (22 @ 15:05)  THC, Urine Qualitative: Positive (22 @ 15:05)  Methadone, Urine: Negative (22 @ 15:05)  Opiate, Urine: Negative (22 @ 15:05)  Phencyclidine Level, Urine: Negative (22 @ 15:05)  Amphetamine, Urine: Negative (22 @ 15:05)  Barbiturates Screen, Urine: Negative (22 @ 15:05)  Benzodiazepine, Urine: Negative (22 @ 15:05)        MEDICATIONS:      PRN Medications: none  • PRN Medications since last evaluation	  • PRN Details	    Current Medications:   Past Pysch Meds: Seroquel, Prazosin, Remeron per previous chart      Medication Side Effects:  • Medication Side Effects or Adverse Reactions (new or ongoing)	None known    MENTAL STATUS EXAM:   • Level of Consciousness	Alert  • General Appearance	No deformities present   • Body Habitus	Average build  • Hygiene	Fair  • Grooming	Fair  • Behavior	Uncooperative  • Eye Contact	Poor  • Relatedness	Poor  • Impulse Control	Normal  • Muscle Tone / Strength	Normal muscle tone/strength  • Abnormal Movements	No abnormal movements  • Gait / Station	Normal gait / station  • Speech Volume	Normal  • Speech Rate	Normal  • Speech Spontaneity	Normal  • Speech Articulation	Normal  • Mood	Irritable   • Affect Quality	Irritable   • Affect Range	Constricted   • Affect Congruence	Congruent  • Thought Process	              Linear  • Thought Associations	Normal  • Thought Content	Preoccupied with getting food. Refused interview for further assessment   • Perceptions	No abnormalities  • Oriented to Time	Yes  • Oriented to Place	Yes  • Oriented to Situation	Yes  • Oriented to Person	Yes  • Attention / Concentration	Impaired   • Estimated Intelligence	Average  • Recent Memory	Unable to assess  • Remote Memory	Unable to assess   • Fund of Knowledge	Normal  • Language	No abnormalities noted  • Judgment (regarding everyday events)	Fair  • Insight (regarding psychiatric illness)	Fair    SUICIDALITY:   • Suicidality (Interval)	none known    HOMICIDALITY/AGGRESSION:   • Homicidality/Aggression	none known    DIAGNOSIS DSM-V:    Psychiatric Diagnosis (Corresponds to DSM-IV Axis I, II):   HEALTH ISSUES - PROBLEM Dx:  Intoxication with cocaine, with delirium  Hypertension, unspecified type  Elevated troponin  Marijuana use              Medical Diagnosis (Corresponds to DSM-IV Axis III):  • Axis III	  HTN    ASSESSMENT OF CURRENT CONDITION:   Summary (include case differential, formulation and patient response to therapy):   61 year old male with PMHx of bipolar depression, PTSD, anxiety, [facility], [facility] [place] with no prior SA or SIB, history of cocaine abuse and marijuana use presented for worsening depression and SI admitting to crack/cocaine use. Patient refused to speak with me at this time, unable to assess for current SI. Will need reassessment.     Risk Assessment (consider static vs modifiable risk factors and protective factors; comment on level of risk for dangerous behavior): Unable to assess    PLAN  Needs reassessment

## 2022-03-10 NOTE — ED CDU PROVIDER SUBSEQUENT DAY NOTE - MEDICAL DECISION MAKING DETAILS
po dose of Hydralazine ordered and pt to be re-evaluated by psych this AM po dose of Hydralazine ordered and pt to have nuclear stress test today and to be re-evaluated by psych this AM

## 2022-03-11 DIAGNOSIS — F31.9 BIPOLAR DISORDER, UNSPECIFIED: ICD-10-CM

## 2022-03-11 DIAGNOSIS — I10 ESSENTIAL (PRIMARY) HYPERTENSION: Chronic | ICD-10-CM

## 2022-03-11 DIAGNOSIS — F19.10 OTHER PSYCHOACTIVE SUBSTANCE ABUSE, UNCOMPLICATED: ICD-10-CM

## 2022-03-11 DIAGNOSIS — I21.4 NON-ST ELEVATION (NSTEMI) MYOCARDIAL INFARCTION: ICD-10-CM

## 2022-03-11 DIAGNOSIS — I16.0 HYPERTENSIVE URGENCY: ICD-10-CM

## 2022-03-11 LAB
ANION GAP SERPL CALC-SCNC: 11 MMOL/L — SIGNIFICANT CHANGE UP (ref 5–17)
APTT BLD: 62.9 SEC — HIGH (ref 27.5–35.5)
BUN SERPL-MCNC: 28.4 MG/DL — HIGH (ref 8–20)
CALCIUM SERPL-MCNC: 8.7 MG/DL — SIGNIFICANT CHANGE UP (ref 8.6–10.2)
CHLORIDE SERPL-SCNC: 103 MMOL/L — SIGNIFICANT CHANGE UP (ref 98–107)
CO2 SERPL-SCNC: 25 MMOL/L — SIGNIFICANT CHANGE UP (ref 22–29)
CREAT SERPL-MCNC: 1.75 MG/DL — HIGH (ref 0.5–1.3)
EGFR: 43 ML/MIN/1.73M2 — LOW
GLUCOSE SERPL-MCNC: 88 MG/DL — SIGNIFICANT CHANGE UP (ref 70–99)
HCT VFR BLD CALC: 39.6 % — SIGNIFICANT CHANGE UP (ref 39–50)
HGB BLD-MCNC: 12.2 G/DL — LOW (ref 13–17)
MAGNESIUM SERPL-MCNC: 2 MG/DL — SIGNIFICANT CHANGE UP (ref 1.6–2.6)
MCHC RBC-ENTMCNC: 28.8 PG — SIGNIFICANT CHANGE UP (ref 27–34)
MCHC RBC-ENTMCNC: 30.8 GM/DL — LOW (ref 32–36)
MCV RBC AUTO: 93.4 FL — SIGNIFICANT CHANGE UP (ref 80–100)
PLATELET # BLD AUTO: 189 K/UL — SIGNIFICANT CHANGE UP (ref 150–400)
POTASSIUM SERPL-MCNC: 4.3 MMOL/L — SIGNIFICANT CHANGE UP (ref 3.5–5.3)
POTASSIUM SERPL-SCNC: 4.3 MMOL/L — SIGNIFICANT CHANGE UP (ref 3.5–5.3)
RBC # BLD: 4.24 M/UL — SIGNIFICANT CHANGE UP (ref 4.2–5.8)
RBC # FLD: 14.5 % — SIGNIFICANT CHANGE UP (ref 10.3–14.5)
SODIUM SERPL-SCNC: 139 MMOL/L — SIGNIFICANT CHANGE UP (ref 135–145)
TROPONIN T SERPL-MCNC: 0.4 NG/ML — HIGH (ref 0–0.06)
TROPONIN T SERPL-MCNC: 0.47 NG/ML — HIGH (ref 0–0.06)
WBC # BLD: 6.03 K/UL — SIGNIFICANT CHANGE UP (ref 3.8–10.5)
WBC # FLD AUTO: 6.03 K/UL — SIGNIFICANT CHANGE UP (ref 3.8–10.5)

## 2022-03-11 PROCEDURE — 99217: CPT

## 2022-03-11 PROCEDURE — 78452 HT MUSCLE IMAGE SPECT MULT: CPT | Mod: 26

## 2022-03-11 PROCEDURE — 99223 1ST HOSP IP/OBS HIGH 75: CPT

## 2022-03-11 PROCEDURE — 93018 CV STRESS TEST I&R ONLY: CPT

## 2022-03-11 PROCEDURE — 93010 ELECTROCARDIOGRAM REPORT: CPT

## 2022-03-11 PROCEDURE — 93016 CV STRESS TEST SUPVJ ONLY: CPT

## 2022-03-11 RX ORDER — HYDRALAZINE HCL 50 MG
50 TABLET ORAL
Refills: 0 | Status: DISCONTINUED | OUTPATIENT
Start: 2022-03-11 | End: 2022-03-13

## 2022-03-11 RX ORDER — HEPARIN SODIUM 5000 [USP'U]/ML
4100 INJECTION INTRAVENOUS; SUBCUTANEOUS ONCE
Refills: 0 | Status: COMPLETED | OUTPATIENT
Start: 2022-03-11 | End: 2022-03-11

## 2022-03-11 RX ORDER — HYDRALAZINE HCL 50 MG
10 TABLET ORAL
Refills: 0 | Status: DISCONTINUED | OUTPATIENT
Start: 2022-03-11 | End: 2022-03-15

## 2022-03-11 RX ORDER — CLOPIDOGREL BISULFATE 75 MG/1
75 TABLET, FILM COATED ORAL DAILY
Refills: 0 | Status: DISCONTINUED | OUTPATIENT
Start: 2022-03-11 | End: 2022-03-14

## 2022-03-11 RX ORDER — HEPARIN SODIUM 5000 [USP'U]/ML
INJECTION INTRAVENOUS; SUBCUTANEOUS
Qty: 25000 | Refills: 0 | Status: DISCONTINUED | OUTPATIENT
Start: 2022-03-11 | End: 2022-03-14

## 2022-03-11 RX ORDER — HEPARIN SODIUM 5000 [USP'U]/ML
4100 INJECTION INTRAVENOUS; SUBCUTANEOUS EVERY 6 HOURS
Refills: 0 | Status: DISCONTINUED | OUTPATIENT
Start: 2022-03-11 | End: 2022-03-14

## 2022-03-11 RX ORDER — ASPIRIN/CALCIUM CARB/MAGNESIUM 324 MG
81 TABLET ORAL DAILY
Refills: 0 | Status: DISCONTINUED | OUTPATIENT
Start: 2022-03-11 | End: 2022-03-16

## 2022-03-11 RX ORDER — HYDRALAZINE HCL 50 MG
25 TABLET ORAL ONCE
Refills: 0 | Status: COMPLETED | OUTPATIENT
Start: 2022-03-11 | End: 2022-03-11

## 2022-03-11 RX ORDER — HYDRALAZINE HCL 50 MG
5 TABLET ORAL ONCE
Refills: 0 | Status: DISCONTINUED | OUTPATIENT
Start: 2022-03-11 | End: 2022-03-11

## 2022-03-11 RX ORDER — ISOSORBIDE MONONITRATE 60 MG/1
60 TABLET, EXTENDED RELEASE ORAL DAILY
Refills: 0 | Status: DISCONTINUED | OUTPATIENT
Start: 2022-03-11 | End: 2022-03-16

## 2022-03-11 RX ADMIN — ISOSORBIDE MONONITRATE 60 MILLIGRAM(S): 60 TABLET, EXTENDED RELEASE ORAL at 16:34

## 2022-03-11 RX ADMIN — Medication 25 MILLIGRAM(S): at 10:11

## 2022-03-11 RX ADMIN — Medication 25 MILLIGRAM(S): at 11:45

## 2022-03-11 RX ADMIN — AMLODIPINE BESYLATE 10 MILLIGRAM(S): 2.5 TABLET ORAL at 10:10

## 2022-03-11 RX ADMIN — Medication 10 MILLIGRAM(S): at 20:45

## 2022-03-11 RX ADMIN — HEPARIN SODIUM 800 UNIT(S)/HR: 5000 INJECTION INTRAVENOUS; SUBCUTANEOUS at 21:57

## 2022-03-11 RX ADMIN — Medication 0.1 MILLIGRAM(S): at 22:14

## 2022-03-11 RX ADMIN — Medication 50 MILLIGRAM(S): at 17:54

## 2022-03-11 RX ADMIN — HEPARIN SODIUM 800 UNIT(S)/HR: 5000 INJECTION INTRAVENOUS; SUBCUTANEOUS at 16:06

## 2022-03-11 RX ADMIN — HEPARIN SODIUM 4100 UNIT(S): 5000 INJECTION INTRAVENOUS; SUBCUTANEOUS at 16:05

## 2022-03-11 NOTE — H&P ADULT - ASSESSMENT
61 year old male with PMH HTN, 'heart problems', Depression, polysubstance abuse presented to [facility] seeking help for his drug addiction and he is ready to stop. Also states he's depressed, though denies any suicidal or homicidal ideation. In ER patient with elevated BP >200/100, Positive Troponin, Elevated SCr and toxicology screen positive for cocaine and THC. TTE with decreased EF 40-45%, Grade II DD and decreased RVSF. NST also positive and patient now being recommended admission for cardiac catheterization.        NSTEMI, in setting of crack cocaine abuse with Hypertensive urgency  - Admit to telemetry  - DAPT, Statin, Heparin  - Amlodipine, Hydralazine and Nitrate started in OBS, will continue  - Add Clonidine  - LDL, A1c  - Cardiology fo cardiac catheterization on 3/14/22. Patient currently amenable to admission and angiogram    BRIANNA vs CKD. Interval improvement in renal functions  - I/O.  - US KUB  - Monitor renal functions  - Avoid Nephrotoxins    Depression  - Constant observation  -  follow up    Polysubstance abuse  - SW    VTEp - Heparin
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
## 2022-03-11 NOTE — PROGRESS NOTE ADULT - ASSESSMENT
60 yo male history of PTSD, bipolar depression, and anxiety presents stating that he is experiencing thoughts of wanting to end his life. He states that he is tired of his life. Patient does not endorse any specific plan with which to end his life. Patient states that he last snorted cocaine yesterday, he has snorted cocaine in the past. Besides cocaine, patient also uses marijuana. Denies other drug or alcohol use. Denies fever/chills, cough, sore throat, sob, cp, abd pain, n/v/d, urinary symptoms, dizziness, headache, focal weakness/numbness/tingling in extremities. Pt found to have increased troponin, which is now down trending and also has EKG with T wave changes.     He remains in the ED. NM stress performed today, pending read.

## 2022-03-11 NOTE — ED ADULT NURSE NOTE - OBJECTIVE STATEMENT
AAOx3 c/o SOB. Hx of recent pacemaker. Denies trauma, fall, NVD, CP. On Bipap. Awaiting further provider orders. Will continue to assess AAOx3 c/o feeling thoughts of hurting self. No injuries noted. 1:1 maintained.

## 2022-03-11 NOTE — ED CDU PROVIDER SUBSEQUENT DAY NOTE - PHYSICAL EXAMINATION
Gen: NAD.   	Head: NC/AT  	Neck: trachea midline  	Resp:  No distress, lungs cta  	Cardiac: Heart RRR. No LE edema. Hypertensive.   	Abdomen: Soft, nondistended.   	Ext: no deformities  	Neuro:  A&O appears non focal  	Skin:  Warm and dry as visualized  Psych:  Endorses SI.
Gen: NAD.   	Head: NC/AT  	Neck: trachea midline  	Resp:  No distress, lungs cta  	Cardiac: Heart RRR. No LE edema. Hypertensive.   	Abdomen: Soft, nondistended.   	Ext: no deformities  	Neuro:  A&O appears non focal  	Skin:  Warm and dry as visualized  Psych:  Endorses SI.

## 2022-03-11 NOTE — CHART NOTE - NSCHARTNOTEFT_GEN_A_CORE
patient with up trending troponin .08->.04->0.47  Patient now with abnormal NM Stress.   Will start patient on:  ASA 81mg  Plavix 75mg  Heparin GTT  Isosorbide 30mg  continue Hydralazine for HTN.   plan for LHC on Monday if patient is agreeable.   Plan d/w Dr. Yu and Dr. Frausto        IMPRESSIONS: Abnormal Study  * Review of raw data shows: Minor motion artifact.  * There is a small, moderate defect in mid to distal  inferolateral wall that is partially reversible,  suggestive of mild ischemia.  * The left ventricle is dilated, FCJZW=015 ml. RV is  dilated. Inferior wall is hypokinetic. Overall post-stress  LVEF is 36%. patient with up trending troponin .08->.04->0.47  Patient now with abnormal NM Stress.   Will start patient on:  ASA 81mg  Plavix 75mg  Heparin GTT  Isosorbide 30mg  continue Hydralazine for HTN.   plan for LHC on Monday if patient is agreeable.   Plan d/w Dr. Yu and Dr. Frausto  Patient to be admitted. D/w with Dr. Felix      IMPRESSIONS: Abnormal Study  * Review of raw data shows: Minor motion artifact.  * There is a small, moderate defect in mid to distal  inferolateral wall that is partially reversible,  suggestive of mild ischemia.  * The left ventricle is dilated, WKKYE=289 ml. RV is  dilated. Inferior wall is hypokinetic. Overall post-stress  LVEF is 36%.

## 2022-03-11 NOTE — H&P ADULT - NSICDXPASTMEDICALHX_GEN_ALL_CORE_FT
PAST MEDICAL HISTORY:  Bipolar depression     Heart problem     Hypertension, unspecified type     Polysubstance abuse

## 2022-03-11 NOTE — CHART NOTE - NSCHARTNOTEFT_GEN_A_CORE
Called earlier by RN, monitor tech reporting possible in change in t waves.  Pt admitted for NSTEMI, followed by Cardio s/p abnormal stress test, on heparin drip, plan for LHC on Monday.  Pt with no complaints currently and VSS.  EKG performed- appears similar to previous, t wave inversions in II, III, V2, V3, V4, V5-  discussed with Cardio PA- she will review EKG. Called earlier by RN, monitor tech reporting possible in change in t waves.  Pt admitted for NSTEMI, followed by Cardio s/p abnormal stress test, on heparin drip, plan for LHC on Monday.  Pt with no complaints currently and VSS.  EKG performed- appears similar to previous, SR t wave inversions in II, III, V2, V3, V4, V5-  discussed with Cardio PA- she will review EKG.

## 2022-03-11 NOTE — ED CDU PROVIDER SUBSEQUENT DAY NOTE - ATTENDING CONTRIBUTION TO CARE
61yoM; with PMH signif for PTSD, Bipolar, Anxiety; now p/w not wanting to live 2/2 using drugs too much. states he has no plan, but no longer wants to live because he uses crack too much and is destroying his life. patient states it was snowing outside so he felt like it was a good time to come get help.  denies hi/ah/vh. denies cp/sob/palp. denies cough. denies abd pain. denies n/v/d. denies headache. denies numbness/tingling. denies focal weakness. denies blurry vision. in triage, found to be severely hypertensive.  Gen: Alert, NAD  Head: NC, AT, PERRL, EOMI, normal lids/conjunctiva  Neck: +supple, no tenderness/meningismus/JVD, +Trachea midline  Pulm: Bilateral BS, normal resp effort, no wheeze/stridor/retractions  CV: RRR, no M/R/G, 2+dist pulses  Abd: soft, NT/ND, +BS, no hepatosplenomegaly  Mskel: ROM intact x4 extremities.  no edema/erythema/cyanosis  Skin: no rash, warm, dry  Neuro: AAOx3, no sensory/motor deficits, CN 2-12 intact  A/P: 61yoM p/w hypertensive emergency & passive SI  -Passive SI/Drug abuse: psych re-eval in AM, sw for sbirt  -Hypertensive urgency: echo, nuclear stress
61yoM; with PMH signif for PTSD, Bipolar, Anxiety; now p/w not wanting to live 2/2 using drugs too much. states he has no plan, but no longer wants to live because he uses crack too much and is destroying his life. patient states it was snowing outside so he felt like it was a good time to come get help.  denies hi/ah/vh. denies cp/sob/palp. denies cough. denies abd pain. denies n/v/d. denies headache. denies numbness/tingling. denies focal weakness. denies blurry vision. in triage, found to be severely hypertensive.  Gen: Alert, NAD  Head: NC, AT, PERRL, EOMI, normal lids/conjunctiva  Neck: +supple, no tenderness/meningismus/JVD, +Trachea midline  Pulm: Bilateral BS, normal resp effort, no wheeze/stridor/retractions  CV: RRR, no M/R/G, 2+dist pulses  Abd: soft, NT/ND, +BS, no hepatosplenomegaly  Mskel: ROM intact x4 extremities.  no edema/erythema/cyanosis  Skin: no rash, warm, dry  Neuro: AAOx3, no sensory/motor deficits, CN 2-12 intact  A/P: 61yoM p/w hypertensive emergency & passive SI  -Passive SI/Drug abuse: psych re-eval in AM, sw for sbirt  -Hypertensive urgency: echo, nuclear stress

## 2022-03-11 NOTE — ED ADULT NURSE NOTE - NS_BH TRG QUESTION8_ED_ALL_ED
SUBJECTIVE:                                                    Abbey Chen is a 17 year old female, here for a routine health maintenance visit,   accompanied by her self.    QUESTIONS/CONCERNS: Fatigue, bone and joint pain for 2 months. She is worried about Leukemia. Right knee and right hip.     Patient was roomed by: Melanie Guadarrama LPN    Do you have any forms to be completed?  no    SOCIAL HISTORY  Family members in house: mother, father, 2 sisters and 1 brother  Language(s) spoken at home: English, Taking Mosotho classes  Recent family changes/social stressors: none noted    SAFETY/HEALTH RISKS  TB exposure:  No  Cardiac risk assessment: none    VISION   Wears glasses: worn for testing  Question Validity: no  Right eye: 20/20  Left eye: 20/20  Vision Assessment: normal    HEARING  Right Ear:       500 Hz: RESPONSE- on Level:   25 db    1000 Hz: RESPONSE- on Level:   25 db    2000 Hz: RESPONSE- on Level:   20 db    4000 Hz: RESPONSE- on Level:   20 db   Left Ear:       500 Hz: RESPONSE- on Level:   40 db    1000 Hz: RESPONSE- on Level:   40 db    2000 Hz: RESPONSE- on Level:   40 db    4000 Hz: RESPONSE- on Level:   40 db   Question Validity: no  Hearing Assessment: normal    DENTAL  Dental health HIGH risk factors: none  Water source:  WELL WATER    No sports physical needed.        SAFETY  Car seat belt always worn:  Yes  Helmet worn for bicycle/roller blades/skateboard?  Not applicable  Guns/firearms in the home: YES, Trigger locks present? YES, Ammunition separate from firearm: YES    ELECTRONIC MEDIA  TV in bedroom: No  >2 hours/ day    EDUCATION  School:  [employer]  thGthrthathdtheth:th th1th1th School performance / Academic skills: doing well in school  Days of school missed: 5 or fewer  Concerns: no    ACTIVITIES  Do you get at least 60 minutes per day of physical activity, including time in and out of school: NO  Extra-curricular activities: Student [place], [place] city ambassador, Senior and [employer]  board  Organized / team sports:  none    DIET  Do you get at least 4 helpings of a fruit or vegetable every day: Yes  How many servings of juice, non-diet soda, punch or sports drinks per day: Rare pop     SLEEP  No concerns, sleeps well through night    ============================================================    PROBLEM LIST  Patient Active Problem List   Diagnosis   (none) - all problems resolved or deleted     MEDICATIONS  Current Outpatient Prescriptions   Medication Sig Dispense Refill     NO ACTIVE MEDICATIONS         ALLERGY  No Known Allergies    IMMUNIZATIONS  Immunization History   Administered Date(s) Administered     DTAP (<7y) 07/07/2004     Hepatitis A Vac Ped/Adol-2 Dose 03/30/2011, 06/19/2014     Human Papilloma Virus 01/27/2010, 07/22/2010, 10/18/2010     IPV 07/07/2004     MMR 12/02/1999, 07/07/2004     Meningococcal (Menomune ) 03/30/2011     Tdap (Adacel,Boostrix) 03/30/2011     Varicella 12/02/1999, 03/30/2011       HEALTH HISTORY SINCE LAST VISIT  No surgery, major illness or injury since last physical exam    DRUGS  Smoking:  no  Passive smoke exposure:  no  Alcohol:  no  Drugs:  no    SEXUALITY  Sexual attraction:  opposite sex  Sexual activity: No  Birth control:  abstinence  STD: no  Unwanted sex:  no    PSYCHO-SOCIAL/DEPRESSION  General screening:  Pediatric Symptom Checklist-Youth PASS (score 18--<30 pass), no followup necessary  No concerns    ROS  GENERAL: See health history, nutrition and daily activities   SKIN: No  rash, hives or significant lesions  HEENT: Hearing/vision: see above.  No eye, nasal, ear symptoms.- glasses, sees Dentist  RESP: No cough or other concerns  CV: No concerns  GI: See nutrition and elimination.  No concerns.  : See elimination. No concerns  NEURO: No headaches or concerns.  PSYCH: See development and behavior, or mental health    OBJECTIVE:                                                    EXAM  /64  Pulse 80  Temp 98.3  F (36.8  C) (Tympanic)   "Resp 20  Ht 5' 2\" (1.575 m)  Wt 125 lb (56.7 kg)  LMP 02/27/2017  BMI 22.86 kg/m2  19 %ile based on CDC 2-20 Years stature-for-age data using vitals from 3/16/2017.  51 %ile based on CDC 2-20 Years weight-for-age data using vitals from 3/16/2017.  67 %ile based on CDC 2-20 Years BMI-for-age data using vitals from 3/16/2017.  Blood pressure percentiles are 30.0 % systolic and 47.3 % diastolic based on NHBPEP's 4th Report.   GENERAL: Active, alert, in no acute distress.  SKIN: Clear. No significant rash, abnormal pigmentation or lesions  HEAD: Normocephalic  EYES: Pupils equal, round, reactive, Extraocular muscles intact. Normal conjunctivae.  EARS: Normal canals. Tympanic membranes are normal; gray and translucent.  NOSE: Normal without discharge.  MOUTH/THROAT: Clear. No oral lesions. Teeth without obvious abnormalities.  NECK: Supple, no masses.  No thyromegaly.  LYMPH NODES: No adenopathy  LUNGS: Clear. No rales, rhonchi, wheezing or retractions  HEART: Regular rhythm. Normal S1/S2. No murmurs. Normal pulses.  ABDOMEN: Soft, non-tender, not distended, no masses or hepatosplenomegaly. Bowel sounds normal.   NEUROLOGIC: No focal findings. Cranial nerves grossly intact: DTR's normal. Normal gait, strength and tone  BACK: Spine is straight, no scoliosis.  EXTREMITIES: Full range of motion, no deformities  : Exam deferred.    ASSESSMENT/PLAN:                                                        ICD-10-CM    1. Encounter for C (well child check) with abnormal findings Z00.121 PURE TONE HEARING TEST, AIR     SCREENING, VISUAL ACUITY, QUANTITATIVE, BILAT     BEHAVIORAL / EMOTIONAL ASSESSMENT [10722]   2. Fatigue, unspecified type R53.83 CBC with platelets     Lyme Disease Criss with reflex to WB Serum     Vitamin D Deficiency   3. Multiple joint pain M25.50 CBC with platelets     Lyme Disease Criss with reflex to WB Serum     Vitamin D Deficiency       Anticipatory Guidance  Reviewed Anticipatory Guidance in patient " "instructions    Preventive Care Plan  Immunizations    Reviewed, up to date  Referrals/Ongoing Specialty care: No   See other orders in [facility].  Cleared for sports:  Not addressed  BMI at 67 %ile based on CDC 2-20 Years BMI-for-age data using vitals from 3/16/2017.  No weight concerns.  Dental visit recommended: Yes, Continue care every 6 months    FOLLOW-UP: in 1-2 years for a Preventive Care visit  Patient Instructions   1. Labs pended- we'll mail results  2. Start Vitamin D3 2,000 IU daily (Nature Made Brand preferred) over-the-counter   3. Ear wax:    Use hydrogen peroxide and water 1:1 and pour in ear, let sit, and turn head to drain. Repeat once or twice. This can be done weekly    Use 1 drop of mineral oil to both ears before bathing weekly    Use over-the-counter Debrox ear solution to break up wax    You may need to come back to the clinic to have your ears flushed    Preventive Care at the 15 - 18 Year Visit    Growth Percentiles & Measurements   Weight: 125 lbs 0 oz / 56.7 kg (actual weight) / 51 %ile based on CDC 2-20 Years weight-for-age data using vitals from 3/16/2017.   Length: 5' 2\" / 157.5 cm 19 %ile based on CDC 2-20 Years stature-for-age data using vitals from 3/16/2017.   BMI: Body mass index is 22.86 kg/(m^2). 67 %ile based on CDC 2-20 Years BMI-for-age data using vitals from 3/16/2017.   Blood Pressure: Blood pressure percentiles are 30.0 % systolic and 47.3 % diastolic based on NHBPEP's 4th Report.     Next Visit    Continue to see your health care provider every one to two years for preventive care.    Nutrition    It s very important to eat breakfast. This will help you make it through the morning.    Sit down with your family for a meal on a regular basis.    Eat healthy meals and snacks, including fruits and vegetables. Avoid salty and sugary snack foods.    Be sure to eat foods that are high in calcium and iron.    Avoid or limit caffeine (often found in soda pop).    Sleeping    Your " body needs about 9 hours of sleep each night.    Keep screens (TV, computer, and video) out of the bedroom / sleeping area.  They can lead to poor sleep habits and increased obesity.    Health    Limit TV, computer and video time.    Set a goal to be physically fit.  Do some form of exercise every day.  It can be an active sport like skating, running, swimming, a team sport, etc.    Try to get 30 to 60 minutes of exercise at least three times a week.    Make healthy choices: don t smoke or drink alcohol; don t use drugs.    In your teen years, you can expect . . .    To develop or strengthen hobbies.    To build strong friendships.    To be more responsible for yourself and your actions.    To be more independent.    To set more goals for yourself.    To use words that best express your thoughts and feelings.    To develop self-confidence and a sense of self.    To make choices about your education and future career.    To see big differences in how you and your friends grow and develop.    To have body odor from perspiration (sweating).  Use underarm deodorant each day.    To have some acne, sometimes or all the time.  (Talk with your doctor or nurse about this.)    Most girls have finished going through puberty by 15 to 16 years. Often, boys are still growing and building muscle mass.    Sexuality    It is normal to have sexual feelings.    Find a supportive person who can answer questions about puberty, sexual development, sex, abstinence (choosing not to have sex), sexually transmitted diseases (STDs) and birth control.    Think about how you can say no to sex.    Safety    Accidents are the greatest threat to your health and life.    Avoid dangerous behaviors and situations.  For example, never drive after drinking or using drugs.  Never get in a car if the  has been drinking or using drugs.    Always wear a seat belt in the car.  When you drive, make it a rule for all passengers to wear seat belts,  too.    Stay within the speed limit and avoid distractions.    Practice a fire escape plan at home. Check smoke detector batteries twice a year.    Keep electric items (like blow dryers, razors, curling irons, etc.) away from water.    Wear a helmet and other protective gear when bike riding, skating, skateboarding, etc.    Use sunscreen to reduce your risk of skin cancer.    Learn first aid and CPR (cardiopulmonary resuscitation).    Avoid peers who try to pressure you into risky activities.    Learn skills to manage stress, anger and conflict.    Do not use or carry any kind of weapon.    Find a supportive person (teacher, parent, health provider, counselor) whom you can talk to when you feel sad, angry, lonely or like hurting yourself.    Find help if you are being abused physically or sexually, or if you fear being hurt by others.    As a teenager, you will be given more responsibility for your health and health care decisions.  While your parent or guardian still has an important role, you will likely start spending some time alone with your health care provider as you get older.  Some teen health issues are actually considered confidential, and are protected by law.  Your health care team will discuss this and what it means with you.  Our goal is for you to become comfortable and confident caring for your own health.  ================================================================      Resources  HPV and Cancer Prevention:  What Parents Should Know  What Kids Should Know About HPV and Cancer  Goal Tracker: Be More Active  Goal Tracker: Less Screen Time  Goal Tracker: Drink More Water  Goal Tracker: Eat More Fruits and Veggies    Karyna Hdez, CNP  [facility]   No

## 2022-03-11 NOTE — PROGRESS NOTE ADULT - PROBLEM SELECTOR PLAN 2
.  - Troponin down trending - will repeat today  - Denies CP or SOB  - TTE - 40 to 45%. Grade II diastolic dysfunction. hypertensive heart disease   - NM Stress performed, pending read  - Keep K >4, Mg >2 to for arrhythmia ppx  -Continue telemetry monitoring.

## 2022-03-11 NOTE — ED CDU PROVIDER SUBSEQUENT DAY NOTE - NSICDXPASTMEDICALHX_GEN_ALL_CORE_FT
PAST MEDICAL HISTORY:  Hypertension, unspecified type     
PAST MEDICAL HISTORY:  Hypertension, unspecified type

## 2022-03-11 NOTE — ED CDU PROVIDER SUBSEQUENT DAY NOTE - HISTORY
Pt observed and monitored overnight with no acute events
Pt observed and monitored overnight and required an additional dose of IV Hydralazine for elevated BP

## 2022-03-11 NOTE — CHART NOTE - NSCHARTNOTEFT_GEN_A_CORE
SW Note: Notified by  provider that the plan is to transfer pt for inpt psychiatric care when medically stable. Some labs are pending and an Echo is ordered. SW will follow and explore options when cleared by MD. Aware pt would like [facility] and [facility] not [facility]. Pt is a .

## 2022-03-11 NOTE — H&P ADULT - NSHPPHYSICALEXAM_GEN_ALL_CORE
VS - reviewed. BP elevated.    Elderly male lying in bed, no acute distress. 1:1 at bedside  extraocular movements intact. Dirty sclera. Arcus senilis, poor dentition  supple neck  regular rate and rhythm S1 S2  CTAB  Soft nondistended nontender BS+  No suprapubic tenderness  FROM, no edema  AAOx3. No gross global or focal deficit appreciated  warm dry skin  Fair mood

## 2022-03-11 NOTE — ED ADULT NURSE REASSESSMENT NOTE - NS ED NURSE REASSESS COMMENT FT1
Patient received from prior shift AAOx3. Patient comfortable. Denies pain. Patient at NST. Will reassess on return.

## 2022-03-11 NOTE — ED BEHAVIORAL HEALTH NOTE - BEHAVIORAL HEALTH NOTE
PROGRESS NOTE: 22 @ 11:47  	  • Reason for Ongoing Consultation: 	    ID: [id number] Male with HEALTH ISSUES - PROBLEM Dx:  Intoxication with cocaine, with delirium    Hypertension, unspecified type    Elevated troponin            INTERVAL DATA:   • Interval Chief Complaint: "I need help".  • Interval History: Seen on follow up.  Interviewed while eating a late breakfast following Stress test.  Medical and cardiac clearance pending.  EHD to be repeated due to elevated qtc>500.  Pt reports severe depression, passive SI, denies plan or intent.  States he is homeless and wants to stop using cocaine (crack).  Reports PPH Bipolar disorder and PTSD depressed and [facility] and [facility] but none for several months.  Pt homeless  Pt denies AH/VH, delusions, denies HIIP or h/o violence.  Denies h/o [place], bizarre happiness.  Pt reports he wants to go to [facility] and rehab to follow.  Pt will be a voluntary psych admission once bed available and medically cleared.    REVIEW OF SYSTEMS:   • Constitutional Symptoms	No complaints  • Eyes	No complaints  • Ears / Nose / Throat / Mouth	No complaints  • Cardiovascular	No complaints  • Respiratory	No complaints  • Gastrointestinal	No complaints  • Genitourinary	No complaints  • Musculoskeletal	No complaints  • Skin	No complaints  • Neurological	No complaints  • Psychiatric (see HPI)	See HPI  • Endocrine	No complaints  • Hematologic / Lymphatic	No complaints  • Allergic / Immunologic	No complaints    REVIEW OF VITALS/LABS/IMAGING/INVESTIGATIONS:   • Vital signs reviewed: Yes  • Vital Signs:	    T(C): 36.6 (22 @ 11:08), Max: 36.8 (22 @ 09:00)  HR: 78 (22 @ 11:08) (72 - 80)  BP: 211/115 (22 @ 11:08) (162/96 - 219/109)  RR: 18 (22 @ 11:08) (18 - 18)  SpO2: 98% (22 @ 11:08) (98% - 100%)    • Available labs reviewed: Yes  • Available Lab Results:                           13.9   3.87  )-----------( 216      ( 09 Mar 2022 14:42 )             45.1         139  |  102  |  28.3<H>  ----------------------------<  100<H>  5.2   |  26.0  |  2.05<H>    Ca    9.2      09 Mar 2022 14:42  Mg     2.2         TPro  8.8<H>  /  Alb  4.2  /  TBili  0.5  /  DBili  x   /  AST  43<H>  /  ALT  24  /  AlkPhos  97      LIVER FUNCTIONS - ( 09 Mar 2022 14:42 )  Alb: 4.2 g/dL / Pro: 8.8 g/dL / ALK PHOS: 97 U/L / ALT: 24 U/L / AST: 43 U/L / GGT: x           PT/INR - ( 09 Mar 2022 14:42 )   PT: 11.8 sec;   INR: 1.02 ratio         PTT - ( 09 Mar 2022 14:42 )  PTT:33.6 sec  Urinalysis Basic - ( 09 Mar 2022 15:06 )    Color: Yellow / Appearance: Clear / S.010 / pH: x  Gluc: x / Ketone: Negative  / Bili: Negative / Urobili: Negative mg/dL   Blood: x / Protein: 100 / Nitrite: Negative   Leuk Esterase: Negative / RBC: 0-2 /HPF / WBC 0-2 /HPF   Sq Epi: x / Non Sq Epi: Occasional / Bacteria: Occasional          MEDICATIONS:      PRN Medications:  • PRN Medications since last evaluation	  • PRN Details	    Current Medications:   amLODIPine   Tablet 10 milliGRAM(s) Oral daily  hydrALAZINE 50 milliGRAM(s) Oral two times a day  hydrALAZINE 25 milliGRAM(s) Oral once     Medication Side Effects:  • Medication Side Effects or Adverse Reactions (new or ongoing)	None known    MENTAL STATUS EXAM:   • Level of Consciousness	Alert  • General Appearance	Well developed  • Body Habitus	underweight  • Hygiene	fair  • Grooming	fair to poor  • Behavior	Cooperative  • Eye Contact	fair  • Relatedness	fair  • Impulse Control	Normal  • Muscle Tone / Strength	Normal muscle tone/strength  • Abnormal Movements	No abnormal movements  • Gait / Station	Normal gait / station  • Speech Volume	Normal  • Speech Rate	Normal  • Speech Spontaneity	Normal  • Speech Articulation	Normal  • Mood	depressed, irritable  • Affect Quality	depressed  • Affect Range	constricted  • Affect Congruence	Congruent  • Thought Process	Linear  • Thought Associations	Normal  • Thought Content	hopeless  • Perceptions	No abnormalities  • Oriented to Time	Yes  • Oriented to Place	Yes  • Oriented to Situation	Yes  • Oriented to Person	Yes  • Attention / Concentration	Normal  • Estimated Intelligence	Average  • Recent Memory	Normal  • Remote Memory	Normal  • Fund of Knowledge	Normal  • Language	No abnormalities noted  • Judgment (regarding everyday events)	Fair  • Insight (regarding psychiatric illness)	Fair    SUICIDALITY:   • Suicidality (Interval)	passive SI    HOMICIDALITY/AGGRESSION:   • Homicidality/Aggression	none known    DIAGNOSIS DSM-V:    Psychiatric Diagnosis (Corresponds to DSM-IV Axis I, II):   HEALTH ISSUES - PROBLEM Dx:  Intoxication with cocaine, with delirium  Bipolar disorder depressed  Hypertension, unspecified type    Elevated troponin             Medical Diagnosis (Corresponds to DSM-IV Axis III):  • Axis III	    see EMR  ASSESSMENT OF CURRENT CONDITION:   Summary (include case differential, formulation and patient response to therapy):   Pt reports Bipolar disorder, PTSD, cocaine abuse off meds, reports passive SI, denies plan or intent, requesting voluntary psych admission for med management, safety and is seeking rehab following psych admission.  Pt will be a voluntary psych admission once medically cleared and bed becomes available.  Risk Assessment (consider static vs modifiable risk factors and protective factors; comment on level of risk for dangerous behavior):   RF noncompliance, drug abuse, homeless, , PTSD, passive SI  PF no prior suicide attempt, help seeking  PLAN    Voluntary psych admission

## 2022-03-11 NOTE — ED CDU PROVIDER DISPOSITION NOTE - CLINICAL COURSE
Pt came in for depression and chest pain after cocaine use.  trop positive but trended down. Pt not cleared by psych.  stress test positive. cards recommends meds and heparin drip for cath on monday. case d/w Dr. wan for admission.

## 2022-03-11 NOTE — PROGRESS NOTE ADULT - NSPROGADDITIONALINFOA_GEN_ALL_CORE
Assessment and recommendations are final when note is signed by the attending.
Assessment and recommendations are final when note is signed by the attending.

## 2022-03-11 NOTE — ED CDU PROVIDER SUBSEQUENT DAY NOTE - NS ED ATTENDING STATEMENT MOD
[facility]  [address] [facility] [place] [address]  Phone: [phone]  Fax: [phone]                  Marcel Montalvo   3/20/2017 8:30 AM   Office Visit    Description:  Female : 1940   Provider:  Varghese Arambula MD   Department:  [facility] - Ophthalmology           Reason for Visit     Diabetic Eye Exam     Dry Eye     Oil Cyst           Diagnoses this Visit        Comments    Diabetes mellitus type 2 without retinopathy    -  Primary     Pseudophakia         Sjogren's syndrome         Encounter for eye exam due to high risk medication         Age-related macular degeneration, dry, both eyes         Eyelid cyst, left                To Do List           Future Appointments        Provider Department Dept Phone    3/30/2017 10:00 AM [facility] XR2 [facility] [phone]    3/30/2017 10:20 AM PULMONARY LAB, [facility]- Pulmonary Function [facility] [phone]    3/30/2017 10:40 AM Artem Walter MD [facility] - Pulmonary Services [phone]    2017 3:20 PM Virgilio Perez MD [facility] - Arrhythmia [phone]    2017 9:00 AM LABORATORY, [facility] [phone]      Goals (5 Years of Data)     None      Follow-Up and Disposition     Return in about 6 months (around 2017).      [facility]     [facility] Care [facility] - 24/7 Assistance  Registered nurses in the [facility] provide clinical advisement, health education, appointment booking, and other advisory services.  Call for this free service at [phone].             Medications           Message regarding Medications     Verify the changes and/or additions to your medication regime listed below are the same as discussed with your clinician today.  If any of these changes or additions are incorrect, please notify your healthcare provider.             Verify that the below list of medications is an accurate representation of the medications you are  currently taking.  If none reported, the list may be blank. If incorrect, please contact your healthcare provider. Carry this list with you in case of emergency.           Current Medications     albuterol (PROAIR HFA) 90 mcg/actuation inhaler Inhale 2 puffs into the lungs every 6 (six) hours as needed.    amoxicillin-clavulanate 875-125mg (AUGMENTIN) 875-125 mg per tablet Take 1 tablet by mouth every 12 (twelve) hours.    aspirin (ECOTRIN) 81 MG EC tablet Take 1 tablet (81 mg total) by mouth once daily.    atorvastatin (LIPITOR) 40 MG tablet TAKE ONE TABLET BY MOUTH NIGHTLY    azelastine (ASTELIN) 137 mcg (0.1 %) nasal spray 2 sprays (274 mcg total) by Nasal route 2 (two) times daily.    benzonatate (TESSALON) 100 MG capsule Take 1 capsule (100 mg total) by mouth 3 (three) times daily as needed for Cough.    budesonide-formoterol 160-4.5 mcg (SYMBICORT) 160-4.5 mcg/actuation HFAA Inhale 2 puffs into the lungs every 12 (twelve) hours. Wash out mouth after using    BYSTOLIC 5 mg Tab TAKE ONE TABLET BY MOUTH TWICE DAILY    carboxymethylcellulose (REFRESH PLUS) 0.5 % Dpet Place 1 drop into both eyes 3 (three) times daily as needed.    diclofenac sodium (VOLTAREN) 1 % Gel Apply 2 g topically once daily.    dofetilide (TIKOSYN) 125 MCG Cap Take 1 capsule (125 mcg total) by mouth every 12 (twelve) hours.    escitalopram oxalate (LEXAPRO) 10 MG tablet TAKE ONE-HALF TO ONE TABLET BY MOUTH EVERY DAY    fluticasone (FLONASE) 50 mcg/actuation nasal spray 2 sprays by Each Nare route once daily.    furosemide (LASIX) 20 MG tablet Take 1 tablet (20 mg total) by mouth as directed.    guaifenesin (MUCINEX) 600 mg 12 hr tablet Take 2 tablets (1,200 mg total) by mouth 2 (two) times daily.    hydroxychloroquine (PLAQUENIL) 200 mg tablet TAKE ONE TABLET BY MOUTH ONCE DAILY    hyoscyamine (ANASPAZ) 0.125 mg TbDL Take 0.125 mg by mouth every 6 (six) hours as needed for Cramping.    nitroGLYCERIN (NITROSTAT) 0.4 MG SL tablet Place 1  tablet (0.4 mg total) under the tongue every 5 (five) minutes as needed for Chest pain.    pantoprazole (PROTONIX) 40 MG tablet Take 1 tablet (40 mg total) by mouth once daily.    phenobarb-hyoscy-atropine-scop (BELLADONNA-PHENOBARBITAL) 16.2-0.1037 -0.0194 mg/5 mL Elix Take 5 mLs by mouth daily as needed (abdominal pain).    potassium chloride (KLOR-CON) 10 MEQ TbSR Take 1 tablet (10 mEq total) by mouth once daily.    pramoxine 1 % Foam Place rectally 3 (three) times daily as needed.    predniSONE (DELTASONE) 20 MG tablet Take 0.5 tablets (10 mg total) by mouth 2 (two) times daily. Take 2 tabs twice daily with food for three days then 1 tab twice daily for three days then 1 daily for three days and then stop. Take your Protonix twice daily Before BF and Dinner for 5 days then once daily before BF as usual.    PREMARIN vaginal cream PLACE 1 GRAM VAGINALLY TWICE A WEEK    RESTASIS 0.05 % ophthalmic emulsion INSTILL ONE DROP INTO EACH EYE TWICE DAILY    rivaroxaban (XARELTO) 20 mg Tab Take 1 tablet (20 mg total) by mouth daily with dinner or evening meal.    tramadol (ULTRAM) 50 mg tablet Take 1 tablet (50 mg total) by mouth 3 (three) times daily as needed.    triamcinolone acetonide 0.1% (KENALOG) 0.1 % ointment AAA bid prn    vitamin D 1000 units Tab Take 2,000 Units by mouth once daily.           Clinical Reference Information           Allergies as of 3/20/2017     Codeine    Lisinopril    Pacerone [Amiodarone]    Sulfa (Sulfonamide Antibiotics)    Celecoxib    Ciprofloxacin    Colesevelam    Doxycycline    Statins-hmg-coa Reductase Inhibitors      Immunizations Administered on Date of Encounter - 3/20/2017     None      Orders Placed During Today's Visit      Normal Orders This Visit    Brown Visual Field - OU - Extended - Both Eyes     OCT- Retina       Language Assistance Services     ATTENTION: Language assistance services are available, free of charge. Please call [phone].      ATENCIÓN: Nataliya domingo  español, tiene a marte disposición servicios gratuitos de asistencia lingüística. Lldemi al [phone].     CHLOÉ Ý: N?u b?n nói Ti?ng Vi?t, có các d?ch v? h? tr? ngôn ng? mi?n phí dành cho b?n. G?i s? [phone].         [facility] - Ophthalmology complies with applicable Federal civil rights laws and does not discriminate on the basis of race, color, national origin, age, disability, or sex.         Attending with Attending Only

## 2022-03-12 LAB
ALBUMIN SERPL ELPH-MCNC: 3 G/DL — LOW (ref 3.3–5.2)
ALP SERPL-CCNC: 65 U/L — SIGNIFICANT CHANGE UP (ref 40–120)
ALT FLD-CCNC: 12 U/L — SIGNIFICANT CHANGE UP
ANION GAP SERPL CALC-SCNC: 11 MMOL/L — SIGNIFICANT CHANGE UP (ref 5–17)
APTT BLD: 48.7 SEC — HIGH (ref 27.5–35.5)
APTT BLD: 53.5 SEC — HIGH (ref 27.5–35.5)
APTT BLD: 63.1 SEC — HIGH (ref 27.5–35.5)
AST SERPL-CCNC: 19 U/L — SIGNIFICANT CHANGE UP
BILIRUB SERPL-MCNC: 0.4 MG/DL — SIGNIFICANT CHANGE UP (ref 0.4–2)
BUN SERPL-MCNC: 31 MG/DL — HIGH (ref 8–20)
CALCIUM SERPL-MCNC: 8.2 MG/DL — LOW (ref 8.6–10.2)
CHLORIDE SERPL-SCNC: 104 MMOL/L — SIGNIFICANT CHANGE UP (ref 98–107)
CHOLEST SERPL-MCNC: 128 MG/DL — SIGNIFICANT CHANGE UP
CO2 SERPL-SCNC: 24 MMOL/L — SIGNIFICANT CHANGE UP (ref 22–29)
CREAT SERPL-MCNC: 1.72 MG/DL — HIGH (ref 0.5–1.3)
EGFR: 44 ML/MIN/1.73M2 — LOW
GLUCOSE SERPL-MCNC: 94 MG/DL — SIGNIFICANT CHANGE UP (ref 70–99)
HCT VFR BLD CALC: 32.3 % — LOW (ref 39–50)
HCV AB S/CO SERPL IA: 14.01 S/CO — HIGH (ref 0–0.99)
HCV AB SERPL-IMP: REACTIVE
HDLC SERPL-MCNC: 63 MG/DL — SIGNIFICANT CHANGE UP
HGB BLD-MCNC: 10.3 G/DL — LOW (ref 13–17)
LIPID PNL WITH DIRECT LDL SERPL: 56 MG/DL — SIGNIFICANT CHANGE UP
MAGNESIUM SERPL-MCNC: 2 MG/DL — SIGNIFICANT CHANGE UP (ref 1.6–2.6)
MCHC RBC-ENTMCNC: 29 PG — SIGNIFICANT CHANGE UP (ref 27–34)
MCHC RBC-ENTMCNC: 31.9 GM/DL — LOW (ref 32–36)
MCV RBC AUTO: 91 FL — SIGNIFICANT CHANGE UP (ref 80–100)
NON HDL CHOLESTEROL: 65 MG/DL — SIGNIFICANT CHANGE UP
PLATELET # BLD AUTO: 163 K/UL — SIGNIFICANT CHANGE UP (ref 150–400)
POTASSIUM SERPL-MCNC: 4 MMOL/L — SIGNIFICANT CHANGE UP (ref 3.5–5.3)
POTASSIUM SERPL-SCNC: 4 MMOL/L — SIGNIFICANT CHANGE UP (ref 3.5–5.3)
PROT SERPL-MCNC: 5.9 G/DL — LOW (ref 6.6–8.7)
RBC # BLD: 3.55 M/UL — LOW (ref 4.2–5.8)
RBC # FLD: 14.3 % — SIGNIFICANT CHANGE UP (ref 10.3–14.5)
SARS-COV-2 RNA SPEC QL NAA+PROBE: SIGNIFICANT CHANGE UP
SODIUM SERPL-SCNC: 139 MMOL/L — SIGNIFICANT CHANGE UP (ref 135–145)
TRIGL SERPL-MCNC: 45 MG/DL — SIGNIFICANT CHANGE UP
WBC # BLD: 4.44 K/UL — SIGNIFICANT CHANGE UP (ref 3.8–10.5)
WBC # FLD AUTO: 4.44 K/UL — SIGNIFICANT CHANGE UP (ref 3.8–10.5)

## 2022-03-12 PROCEDURE — 99234 HOSP IP/OBS SM DT SF/LOW 45: CPT

## 2022-03-12 PROCEDURE — 93010 ELECTROCARDIOGRAM REPORT: CPT

## 2022-03-12 PROCEDURE — 99233 SBSQ HOSP IP/OBS HIGH 50: CPT

## 2022-03-12 PROCEDURE — 76770 US EXAM ABDO BACK WALL COMP: CPT | Mod: 26

## 2022-03-12 RX ORDER — CARVEDILOL PHOSPHATE 80 MG/1
3.12 CAPSULE, EXTENDED RELEASE ORAL EVERY 12 HOURS
Refills: 0 | Status: DISCONTINUED | OUTPATIENT
Start: 2022-03-12 | End: 2022-03-16

## 2022-03-12 RX ORDER — LANOLIN ALCOHOL/MO/W.PET/CERES
3 CREAM (GRAM) TOPICAL ONCE
Refills: 0 | Status: COMPLETED | OUTPATIENT
Start: 2022-03-12 | End: 2022-03-12

## 2022-03-12 RX ORDER — LANOLIN ALCOHOL/MO/W.PET/CERES
3 CREAM (GRAM) TOPICAL AT BEDTIME
Refills: 0 | Status: DISCONTINUED | OUTPATIENT
Start: 2022-03-12 | End: 2022-03-16

## 2022-03-12 RX ADMIN — ISOSORBIDE MONONITRATE 60 MILLIGRAM(S): 60 TABLET, EXTENDED RELEASE ORAL at 12:19

## 2022-03-12 RX ADMIN — HEPARIN SODIUM 950 UNIT(S)/HR: 5000 INJECTION INTRAVENOUS; SUBCUTANEOUS at 05:04

## 2022-03-12 RX ADMIN — Medication 3 MILLIGRAM(S): at 21:54

## 2022-03-12 RX ADMIN — AMLODIPINE BESYLATE 10 MILLIGRAM(S): 2.5 TABLET ORAL at 05:05

## 2022-03-12 RX ADMIN — HEPARIN SODIUM 950 UNIT(S)/HR: 5000 INJECTION INTRAVENOUS; SUBCUTANEOUS at 19:29

## 2022-03-12 RX ADMIN — HEPARIN SODIUM 950 UNIT(S)/HR: 5000 INJECTION INTRAVENOUS; SUBCUTANEOUS at 12:11

## 2022-03-12 RX ADMIN — Medication 50 MILLIGRAM(S): at 17:30

## 2022-03-12 RX ADMIN — CLOPIDOGREL BISULFATE 75 MILLIGRAM(S): 75 TABLET, FILM COATED ORAL at 12:19

## 2022-03-12 RX ADMIN — Medication 0.1 MILLIGRAM(S): at 21:54

## 2022-03-12 RX ADMIN — Medication 3 MILLIGRAM(S): at 00:14

## 2022-03-12 RX ADMIN — Medication 50 MILLIGRAM(S): at 05:05

## 2022-03-12 RX ADMIN — Medication 0.1 MILLIGRAM(S): at 13:59

## 2022-03-12 RX ADMIN — CARVEDILOL PHOSPHATE 3.12 MILLIGRAM(S): 80 CAPSULE, EXTENDED RELEASE ORAL at 17:30

## 2022-03-12 RX ADMIN — Medication 0.1 MILLIGRAM(S): at 05:05

## 2022-03-12 RX ADMIN — Medication 81 MILLIGRAM(S): at 12:19

## 2022-03-12 NOTE — PATIENT PROFILE ADULT - WILL THE PATIENT ACCEPT THE PFIZER COVID-19 VACCINE IF ELIGIBLE AND IT IS AVAILABLE?
[FreeTextEntry1] : This is a 30-year-old female with past medical history significant for status post removal of pilonidal cyst, pregnancy-induced hypertension, who comes in for follow up cardiac evaluation. She denies chest pain, shortness of breath, dizziness or syncope.\par Patient's blood pressure is acceptable limits. She will continue on her current diet and exercise program. She will remain on labetalol 200 mg twice per day and take her Procardia XL in the morning as well. Lifestyle modification including exercise, salt restriction, will reinforce. She will followup with me in 4 months. At this point in time she wishes to come off the medications along with the medications but I told her that we try to lower the dose of her blood pressure increased. At this point in time I asked him modification will need to assist in this vision.\par  She denies chest pain, shortness of breath, dizziness or syncope.\par  She is still breast-feeding and may consider having other children, so I would not consider an ACE inhibitor or ARB at this time.\par The patient has 4 children. Her daughter is 8 years old and she had no difficulty with this pregnancy; she has twins  3 years old and it was during this pregnancy that she developed pregnancy-induced hypertension including low platelets. She was delivered by  section and was placed on magnesium. Eventually her blood pressure got better. She just had her daughter Johana 8 months ago and has developed hypertension.\par Lifestyle modification including salt restriction, adequate hydration, exercise reinforced. Yes

## 2022-03-12 NOTE — PROGRESS NOTE ADULT - ASSESSMENT
61 year old male with PMH HTN, 'heart problems', Depression, polysubstance abuse presented to [facility] seeking help for his drug addiction and he is ready to stop. Also states he's depressed, though denies any suicidal or homicidal ideation. In ER patient with elevated BP >200/100, Positive Troponin, Elevated SCr and toxicology screen positive for cocaine and THC. TTE with decreased EF 40-45%, Grade II DD and decreased RVSF. NST also positive and patient now being recommended admission for cardiac catheterization.        1. NSTEMI, in setting of crack cocaine abuse with Hypertensive urgency  S/p stress test with abnormal results   Chest pain improved   c/w Aspirin, plavix and Heparin full dose   BP improved  c/w  Amlodipine, Hydralazine and Nitrate, clonidine   Plan for cardiac catheterization on 3/14/22.     2. BRIANNA vs CKD. Interval improvement in renal functions   Monitor renal functions  - Avoid Nephrotoxins    3. Depression with passive suicidal ideation    c/w Constant observation  Plan for voluntary psych hospitalization      4. Polysubstance abuse      VTEp - Heparin

## 2022-03-12 NOTE — PATIENT PROFILE ADULT - FALL HARM RISK - UNIVERSAL INTERVENTIONS
Bed in lowest position, wheels locked, appropriate side rails in place/Call bell, personal items and telephone in reach/Instruct patient to call for assistance before getting out of bed or chair/Non-slip footwear when patient is out of bed/[place] to call system/Physically safe environment - no spills, clutter or unnecessary equipment/Purposeful Proactive Rounding/Room/bathroom lighting operational, light cord in reach

## 2022-03-13 LAB
ANION GAP SERPL CALC-SCNC: 26 MMOL/L — HIGH (ref 5–17)
APTT BLD: 58.5 SEC — HIGH (ref 27.5–35.5)
BASOPHILS # BLD AUTO: 0.04 K/UL — SIGNIFICANT CHANGE UP (ref 0–0.2)
BASOPHILS NFR BLD AUTO: 1 % — SIGNIFICANT CHANGE UP (ref 0–2)
BUN SERPL-MCNC: 27.6 MG/DL — HIGH (ref 8–20)
CALCIUM SERPL-MCNC: 7.8 MG/DL — LOW (ref 8.6–10.2)
CHLORIDE SERPL-SCNC: 97 MMOL/L — LOW (ref 98–107)
CK SERPL-CCNC: 124 U/L — SIGNIFICANT CHANGE UP (ref 30–200)
CO2 SERPL-SCNC: 23 MMOL/L — SIGNIFICANT CHANGE UP (ref 22–29)
CREAT SERPL-MCNC: 1.43 MG/DL — HIGH (ref 0.5–1.3)
EGFR: 55 ML/MIN/1.73M2 — LOW
EOSINOPHIL # BLD AUTO: 0.09 K/UL — SIGNIFICANT CHANGE UP (ref 0–0.5)
EOSINOPHIL NFR BLD AUTO: 2.4 % — SIGNIFICANT CHANGE UP (ref 0–6)
GLUCOSE SERPL-MCNC: 94 MG/DL — SIGNIFICANT CHANGE UP (ref 70–99)
HCT VFR BLD CALC: 31.5 % — LOW (ref 39–50)
HGB BLD-MCNC: 10 G/DL — LOW (ref 13–17)
IMM GRANULOCYTES NFR BLD AUTO: 0.3 % — SIGNIFICANT CHANGE UP (ref 0–1.5)
LYMPHOCYTES # BLD AUTO: 0.97 K/UL — LOW (ref 1–3.3)
LYMPHOCYTES # BLD AUTO: 25.5 % — SIGNIFICANT CHANGE UP (ref 13–44)
MAGNESIUM SERPL-MCNC: 2 MG/DL — SIGNIFICANT CHANGE UP (ref 1.6–2.6)
MCHC RBC-ENTMCNC: 28.7 PG — SIGNIFICANT CHANGE UP (ref 27–34)
MCHC RBC-ENTMCNC: 31.7 GM/DL — LOW (ref 32–36)
MCV RBC AUTO: 90.5 FL — SIGNIFICANT CHANGE UP (ref 80–100)
MONOCYTES # BLD AUTO: 0.38 K/UL — SIGNIFICANT CHANGE UP (ref 0–0.9)
MONOCYTES NFR BLD AUTO: 10 % — SIGNIFICANT CHANGE UP (ref 2–14)
NEUTROPHILS # BLD AUTO: 2.32 K/UL — SIGNIFICANT CHANGE UP (ref 1.8–7.4)
NEUTROPHILS NFR BLD AUTO: 60.8 % — SIGNIFICANT CHANGE UP (ref 43–77)
PHOSPHATE SERPL-MCNC: 3.5 MG/DL — SIGNIFICANT CHANGE UP (ref 2.4–4.7)
PLATELET # BLD AUTO: 173 K/UL — SIGNIFICANT CHANGE UP (ref 150–400)
POTASSIUM SERPL-MCNC: 4.8 MMOL/L — SIGNIFICANT CHANGE UP (ref 3.5–5.3)
POTASSIUM SERPL-SCNC: 4.8 MMOL/L — SIGNIFICANT CHANGE UP (ref 3.5–5.3)
RBC # BLD: 3.48 M/UL — LOW (ref 4.2–5.8)
RBC # FLD: 14.6 % — HIGH (ref 10.3–14.5)
SODIUM SERPL-SCNC: 146 MMOL/L — HIGH (ref 135–145)
TROPONIN T SERPL-MCNC: 0.42 NG/ML — HIGH (ref 0–0.06)
WBC # BLD: 3.81 K/UL — SIGNIFICANT CHANGE UP (ref 3.8–10.5)
WBC # FLD AUTO: 3.81 K/UL — SIGNIFICANT CHANGE UP (ref 3.8–10.5)

## 2022-03-13 PROCEDURE — 99234 HOSP IP/OBS SM DT SF/LOW 45: CPT

## 2022-03-13 PROCEDURE — 99233 SBSQ HOSP IP/OBS HIGH 50: CPT

## 2022-03-13 RX ORDER — ATORVASTATIN CALCIUM 80 MG/1
20 TABLET, FILM COATED ORAL AT BEDTIME
Refills: 0 | Status: DISCONTINUED | OUTPATIENT
Start: 2022-03-13 | End: 2022-03-16

## 2022-03-13 RX ORDER — HYDRALAZINE HCL 50 MG
50 TABLET ORAL THREE TIMES A DAY
Refills: 0 | Status: DISCONTINUED | OUTPATIENT
Start: 2022-03-13 | End: 2022-03-14

## 2022-03-13 RX ORDER — LANOLIN ALCOHOL/MO/W.PET/CERES
3 CREAM (GRAM) TOPICAL ONCE
Refills: 0 | Status: COMPLETED | OUTPATIENT
Start: 2022-03-13 | End: 2022-03-13

## 2022-03-13 RX ADMIN — CARVEDILOL PHOSPHATE 3.12 MILLIGRAM(S): 80 CAPSULE, EXTENDED RELEASE ORAL at 16:52

## 2022-03-13 RX ADMIN — Medication 0.1 MILLIGRAM(S): at 16:52

## 2022-03-13 RX ADMIN — Medication 0.1 MILLIGRAM(S): at 05:29

## 2022-03-13 RX ADMIN — Medication 3 MILLIGRAM(S): at 21:59

## 2022-03-13 RX ADMIN — Medication 81 MILLIGRAM(S): at 11:02

## 2022-03-13 RX ADMIN — AMLODIPINE BESYLATE 10 MILLIGRAM(S): 2.5 TABLET ORAL at 05:29

## 2022-03-13 RX ADMIN — Medication 50 MILLIGRAM(S): at 21:58

## 2022-03-13 RX ADMIN — Medication 50 MILLIGRAM(S): at 12:13

## 2022-03-13 RX ADMIN — CARVEDILOL PHOSPHATE 3.12 MILLIGRAM(S): 80 CAPSULE, EXTENDED RELEASE ORAL at 05:29

## 2022-03-13 RX ADMIN — Medication 50 MILLIGRAM(S): at 05:29

## 2022-03-13 RX ADMIN — CLOPIDOGREL BISULFATE 75 MILLIGRAM(S): 75 TABLET, FILM COATED ORAL at 11:02

## 2022-03-13 RX ADMIN — ISOSORBIDE MONONITRATE 60 MILLIGRAM(S): 60 TABLET, EXTENDED RELEASE ORAL at 11:04

## 2022-03-13 RX ADMIN — ATORVASTATIN CALCIUM 20 MILLIGRAM(S): 80 TABLET, FILM COATED ORAL at 21:59

## 2022-03-13 RX ADMIN — Medication 3 MILLIGRAM(S): at 23:58

## 2022-03-13 RX ADMIN — Medication 0.1 MILLIGRAM(S): at 11:05

## 2022-03-13 NOTE — PROGRESS NOTE ADULT - ASSESSMENT
61 year old male with PMH HTN, 'heart problems', Depression, polysubstance abuse presented to [facility] seeking help for his drug addiction and he is ready to stop. Also states he's depressed, though denies any suicidal or homicidal ideation. In ER patient with elevated BP >200/100, Positive Troponin, Elevated SCr and toxicology screen positive for cocaine and THC. TTE with decreased EF 40-45%, Grade II DD and decreased RVSF. NST also positive and patient now being recommended admission for cardiac catheterization.        1. NSTEMI, in setting of crack cocaine abuse with Hypertensive urgency  S/p stress test with abnormal results   Chest pain improved   c/w Aspirin, plavix and Heparin full dose   BP improved  c/w  Amlodipine, Hydralazine and Nitrate, clonidine   Plan for cardiac catheterization on 3/14/22.     2. BRIANNA vs CKD. Interval improvement in renal functions - improving    Monitor renal functions  - Avoid Nephrotoxins    3. Depression with passive suicidal ideation    d/c  Constant observation  Plan for voluntary psych hospitalization      4. Polysubstance abuse  Requesting rehab information   SW consult requested       VTEp - Heparin       61 year old male with PMH HTN, 'heart problems', Depression, polysubstance abuse presented to [facility] seeking help for his drug addiction and he is ready to stop. Also states he's depressed, though denies any suicidal or homicidal ideation. In ER patient with elevated BP >200/100, Positive Troponin, Elevated SCr and toxicology screen positive for cocaine and THC. TTE with decreased EF 40-45%, Grade II DD and decreased RVSF. NST also positive and patient now being recommended admission for cardiac catheterization.        1. NSTEMI, in setting of crack cocaine abuse with Hypertensive urgency  S/p stress test with abnormal results   Chest pain improved   c/w Aspirin, plavix and Heparin full dose   Carvedilol started on 3/12   BP improved  c/w  Amlodipine, Hydralazine and Nitrate, clonidine   Plan for cardiac catheterization on 3/14/22.     2. BRIANNA vs CKD. Interval improvement in renal functions - improving    Monitor renal functions  - Avoid Nephrotoxins    3. Depression with passive suicidal ideation    d/c  Constant observation  Plan for voluntary psych hospitalization      4. Polysubstance abuse  Requesting rehab information   SW consult requested       VTEp - Heparin

## 2022-03-13 NOTE — PROGRESS NOTE ADULT - ASSESSMENT
62 yo male history of PTSD, bipolar depression, and anxiety presents stating that he is experiencing thoughts of wanting to end his life. He states that he is tired of his life. Patient does not endorse any specific plan with which to end his life. Patient states that he last snorted cocaine yesterday, he has snorted cocaine in the past. Besides cocaine, patient also uses marijuana. Denies other drug or alcohol use. Denies fever/chills, cough, sore throat, sob, cp, abd pain, n/v/d, urinary symptoms, dizziness, headache, focal weakness/numbness/tingling in extremities. Pt found to have increased troponin, which is now down trending and also has EKG with T wave changes.       1. NSTEMI  -suspect epicardial CAD, patient agreeable for LHC tomorrow and reiterated need for compliance with DAPT if need stenting  -continue ASA/clopidogrel and heparin gtt  -NPO after midnight    2. NSVT, cardiomyopathy  -increase carvedilol dose as tolerated  -reiterated about abstain from cocaine use  -euvolemic no need diuretic     3. Uncontrolled HTN  -on amlodipine, hydralazine, Imdur and clonidine- consider weaning off clonidine and add ACE/ARB after LHC if Cr. stable    4. BRIANNA  -improving,     5. Suicidal ideation, Depression  -remains on 1:1  -need psych follow up          Dominguez Richardson DO, [facility]  Faculty Non-Invasive Cardiologist  [phone]   62 yo male history of PTSD, bipolar depression, and anxiety presents stating that he is experiencing thoughts of wanting to end his life. He states that he is tired of his life. Patient does not endorse any specific plan with which to end his life. Patient states that he last snorted cocaine yesterday, he has snorted cocaine in the past. Besides cocaine, patient also uses marijuana. Denies other drug or alcohol use. Denies fever/chills, cough, sore throat, sob, cp, abd pain, n/v/d, urinary symptoms, dizziness, headache, focal weakness/numbness/tingling in extremities. Pt found to have increased troponin, which is now down trending and also has EKG with T wave changes.       1. NSTEMI  -suspect epicardial CAD, patient agreeable for LHC tomorrow and reiterated need for compliance with DAPT if need stenting  -continue ASA/clopidogrel and heparin gtt  -LDL 56, add atorvastatin 20mg  -NPO after midnight    2. NSVT, cardiomyopathy  -increase carvedilol dose as tolerated  -reiterated about abstain from cocaine use  -euvolemic no need diuretic     3. Uncontrolled HTN  -on amlodipine, hydralazine, Imdur and clonidine- consider weaning off clonidine and add ACE/ARB after LHC if Cr. stable  -reduce clonidine to BID and increase hydralazine to TID    4. BRIANNA  -improving,     5. Suicidal ideation, Depression  -remains on 1:1  -need psych follow up          Dominguez Richardson DO, [facility]  Faculty Non-Invasive Cardiologist  [phone]

## 2022-03-14 LAB
APTT BLD: 72.2 SEC — HIGH (ref 27.5–35.5)
HCT VFR BLD CALC: 31.1 % — LOW (ref 39–50)
HGB BLD-MCNC: 9.8 G/DL — LOW (ref 13–17)
MCHC RBC-ENTMCNC: 28.7 PG — SIGNIFICANT CHANGE UP (ref 27–34)
MCHC RBC-ENTMCNC: 31.5 GM/DL — LOW (ref 32–36)
MCV RBC AUTO: 90.9 FL — SIGNIFICANT CHANGE UP (ref 80–100)
PLATELET # BLD AUTO: 164 K/UL — SIGNIFICANT CHANGE UP (ref 150–400)
RBC # BLD: 3.42 M/UL — LOW (ref 4.2–5.8)
RBC # FLD: 14.6 % — HIGH (ref 10.3–14.5)
WBC # BLD: 3.49 K/UL — LOW (ref 3.8–10.5)
WBC # FLD AUTO: 3.49 K/UL — LOW (ref 3.8–10.5)

## 2022-03-14 PROCEDURE — 99234 HOSP IP/OBS SM DT SF/LOW 45: CPT

## 2022-03-14 PROCEDURE — 99233 SBSQ HOSP IP/OBS HIGH 50: CPT

## 2022-03-14 PROCEDURE — 93458 L HRT ARTERY/VENTRICLE ANGIO: CPT | Mod: 26

## 2022-03-14 PROCEDURE — 99152 MOD SED SAME PHYS/QHP 5/>YRS: CPT

## 2022-03-14 PROCEDURE — 99232 SBSQ HOSP IP/OBS MODERATE 35: CPT

## 2022-03-14 RX ORDER — ASPIRIN/CALCIUM CARB/MAGNESIUM 324 MG
81 TABLET ORAL DAILY
Refills: 0 | Status: DISCONTINUED | OUTPATIENT
Start: 2022-03-14 | End: 2022-03-14

## 2022-03-14 RX ORDER — SPIRONOLACTONE 25 MG/1
25 TABLET, FILM COATED ORAL DAILY
Refills: 0 | Status: DISCONTINUED | OUTPATIENT
Start: 2022-03-14 | End: 2022-03-16

## 2022-03-14 RX ORDER — LANOLIN ALCOHOL/MO/W.PET/CERES
3 CREAM (GRAM) TOPICAL ONCE
Refills: 0 | Status: COMPLETED | OUTPATIENT
Start: 2022-03-14 | End: 2022-03-14

## 2022-03-14 RX ORDER — HYDRALAZINE HCL 50 MG
100 TABLET ORAL THREE TIMES A DAY
Refills: 0 | Status: DISCONTINUED | OUTPATIENT
Start: 2022-03-14 | End: 2022-03-15

## 2022-03-14 RX ADMIN — ATORVASTATIN CALCIUM 20 MILLIGRAM(S): 80 TABLET, FILM COATED ORAL at 21:24

## 2022-03-14 RX ADMIN — HEPARIN SODIUM 950 UNIT(S)/HR: 5000 INJECTION INTRAVENOUS; SUBCUTANEOUS at 07:42

## 2022-03-14 RX ADMIN — Medication 81 MILLIGRAM(S): at 12:53

## 2022-03-14 RX ADMIN — Medication 10 MILLIGRAM(S): at 18:42

## 2022-03-14 RX ADMIN — AMLODIPINE BESYLATE 10 MILLIGRAM(S): 2.5 TABLET ORAL at 05:47

## 2022-03-14 RX ADMIN — Medication 3 MILLIGRAM(S): at 21:24

## 2022-03-14 RX ADMIN — ISOSORBIDE MONONITRATE 60 MILLIGRAM(S): 60 TABLET, EXTENDED RELEASE ORAL at 12:43

## 2022-03-14 RX ADMIN — Medication 0.1 MILLIGRAM(S): at 05:47

## 2022-03-14 RX ADMIN — Medication 50 MILLIGRAM(S): at 05:47

## 2022-03-14 RX ADMIN — Medication 0.1 MILLIGRAM(S): at 17:30

## 2022-03-14 RX ADMIN — CLOPIDOGREL BISULFATE 75 MILLIGRAM(S): 75 TABLET, FILM COATED ORAL at 12:43

## 2022-03-14 RX ADMIN — CARVEDILOL PHOSPHATE 3.12 MILLIGRAM(S): 80 CAPSULE, EXTENDED RELEASE ORAL at 17:30

## 2022-03-14 RX ADMIN — Medication 3 MILLIGRAM(S): at 23:46

## 2022-03-14 RX ADMIN — CARVEDILOL PHOSPHATE 3.12 MILLIGRAM(S): 80 CAPSULE, EXTENDED RELEASE ORAL at 05:47

## 2022-03-14 RX ADMIN — Medication 100 MILLIGRAM(S): at 21:24

## 2022-03-14 NOTE — SBIRT NOTE ADULT - NSSBIRTDRGBRIEFINTDET_GEN_A_CORE
Pt reports crack use and marijuana use. Pt reports he uses every day; wanting to stop at this time.  Last day of use pt reports was last week.

## 2022-03-14 NOTE — PROGRESS NOTE ADULT - ASSESSMENT
61 year old male with PMH HTN, 'heart problems', Depression, polysubstance abuse presented to [facility] seeking help for his drug addiction and he is ready to stop. Also states he's depressed, though denies any suicidal or homicidal ideation. In ER patient with elevated BP >200/100, Positive Troponin, Elevated SCr and toxicology screen positive for cocaine and THC. TTE with decreased EF 40-45%, Grade II DD and decreased RVSF. NST also positive and patient now being recommended admission for cardiac catheterization.        1. NSTEMI, in setting of crack cocaine abuse with Hypertensive urgency  S/p stress test with abnormal results   Chest pain improved   c/w Aspirin, plavix and Heparin full dose   Carvedilol started on 3/12   BP improved  c/w  Amlodipine, Hydralazine and Nitrate, clonidine   Plan for cardiac catheterization today.       2. BRIANNA vs CKD. Interval improvement in renal functions - improving    Monitor renal functions  - Avoid Nephrotoxins    3. Depression with passive suicidal ideation    d/c  Constant observation  Plan for voluntary psych hospitalization    Called behavioral services for reevaluation     4. Polysubstance abuse  Requesting rehab information   SW consult requested       VTEp - Heparin

## 2022-03-14 NOTE — ED BEHAVIORAL HEALTH NOTE - BEHAVIORAL HEALTH NOTE
PROGRESS NOTE: 03-14-22 @ 16:33  	  • Reason for Ongoing Consultation: 	    ID: 61yyo Male with HEALTH ISSUES - PROBLEM Dx:  NSTEMI (non-ST elevation myocardial infarction)    Hypertensive urgency    Polysubstance abuse    Bipolar depression      INTERVAL DATA:   • Interval Chief Complaint: im doing good   • Interval History:     Patient is a 61 year old male, , domiciled, non-care giver, presents to [facility] for c/o depression with thoughts of hurting himself. Admits to crack and marijuana use yesterday.  Patient seen and evaluated and found to be calm, cooperative and pleasant. Patient states he is doing much better and states he is awaiting to go for a procedure. Patient reports he has not followed with a psychiatrist for a while which he attributes to relapsing on crack cocaine and is motivated to get into a drug program. Patient reports some sleep difficulty, no appetite difficulty, denying any s/h ideation or AVH       REVIEW OF SYSTEMS:   • Constitutional Symptoms	No complaints  • Eyes	No complaints  • Ears / Nose / Throat / Mouth	No complaints  • Cardiovascular	No complaints  • Respiratory	No complaints  • Gastrointestinal	No complaints  • Genitourinary	No complaints  • Musculoskeletal	No complaints  • Skin	No complaints  • Neurological	No complaints  • Psychiatric (see HPI)	See HPI  • Endocrine	No complaints  • Hematologic / Lymphatic	No complaints  • Allergic / Immunologic	No complaints    REVIEW OF VITALS/LABS/IMAGING/INVESTIGATIONS:   • Vital signs reviewed: Yes  • Vital Signs:	    T(C): 36.4 (03-14-22 @ 14:00), Max: 36.7 (03-13-22 @ 20:00)  HR: 73 (03-14-22 @ 16:00) (58 - 81)  BP: 113/63 (03-14-22 @ 16:00) (113/63 - 151/85)  RR: 18 (03-14-22 @ 16:00) (17 - 18)  SpO2: 100% (03-14-22 @ 16:00) (97% - 100%)    • Available labs reviewed: Yes  • Available Lab Results:                           9.8    3.49  )-----------( 164      ( 14 Mar 2022 07:03 )             31.1     03-13    146<H>  |  97<L>  |  27.6<H>  ----------------------------<  94  4.8   |  23.0  |  1.43<H>    Ca    7.8<L>      13 Mar 2022 09:19  Phos  3.5     03-13  Mg     2.0     03-13        PTT - ( 14 Mar 2022 07:04 )  PTT:72.2 sec        MEDICATIONS:      PRN Medications:  • PRN Medications since last evaluation	  • PRN Details	    Current Medications:   amLODIPine   Tablet 10 milliGRAM(s) Oral daily  aspirin  chewable 81 milliGRAM(s) Oral daily  atorvastatin 20 milliGRAM(s) Oral at bedtime  carvedilol 3.125 milliGRAM(s) Oral every 12 hours  cloNIDine 0.1 milliGRAM(s) Oral two times a day  hydrALAZINE 50 milliGRAM(s) Oral three times a day  hydrALAZINE Injectable 10 milliGRAM(s) IV Push every 3 hours PRN  isosorbide   mononitrate ER Tablet (IMDUR) 60 milliGRAM(s) Oral daily  melatonin 3 milliGRAM(s) Oral at bedtime     Medication Side Effects:  • Medication Side Effects or Adverse Reactions (new or ongoing)	None known    MENTAL STATUS EXAM:   • Level of Consciousness	Alert  • General Appearance	Well developed  • Body Habitus	Well nourished  • Hygiene	Good  • Grooming	Good  • Behavior	Cooperative  • Eye Contact	Good  • Relatedness	Good  • Impulse Control	Normal  • Muscle Tone / Strength	Normal muscle tone/strength  • Abnormal Movements	No abnormal movements  • Gait / Station	Normal gait / station  • Speech Volume	Normal  • Speech Rate	Normal  • Speech Spontaneity	Normal  • Speech Articulation	Normal  • Mood	Normal  • Affect Quality	Euthymic  • Affect Range	Full  • Affect Congruence	Congruent  • Thought Process	Linear  • Thought Associations	Normal  • Thought Content	Unremarkable  • Perceptions	No abnormalities  • Oriented to Time	Yes  • Oriented to Place	Yes  • Oriented to Situation	Yes  • Oriented to Person	Yes  • Attention / Concentration	Normal  • Estimated Intelligence	Average  • Recent Memory	Normal  • Remote Memory	Normal  • Fund of Knowledge	Normal  • Language	No abnormalities noted  • Judgment (regarding everyday events)	Fair  • Insight (regarding psychiatric illness)	Fair    SUICIDALITY:   • Suicidality (Interval)	none known    HOMICIDALITY/AGGRESSION:   • Homicidality/Aggression	none known    DIAGNOSIS DSM-V:    Psychiatric Diagnosis (Corresponds to DSM-IV Axis I, II):   HEALTH ISSUES - PROBLEM Dx:  NSTEMI (non-ST elevation myocardial infarction)    Hypertensive urgency    Polysubstance abuse    Bipolar depression             Medical Diagnosis (Corresponds to DSM-IV Axis III):  • Axis III	      ASSESSMENT OF CURRENT CONDITION:   Summary (include case differential, formulation and patient response to therapy):     Patient is a 61 year old male, , domiciled, non-care giver, presents to [facility] for c/o depression with thoughts of hurting himself. Admits to crack and marijuana use yesterday.    Patient seen for follow up and found to be in better spirits, denying any s/h ideation, no symptoms of [place] or psychosis and future oriented requesting to go to inpatient drug rehab.     Recs.   -Patient with no s/h ideation, does not require inpatient psych admission   -SW for possible referral to drug rehab   -Will continue to follow

## 2022-03-14 NOTE — PROGRESS NOTE ADULT - ASSESSMENT
Procedure: Left heart catheterization    1. S/P LHC: Nonobstructive LAD stenosis  ·	Remove radial band at: 4:30PM  ·	Wrist precautions explained.  ·	GDMT:   ·	     DAPT: D/C Plavix, continue aspirin 81 mg daily.  ·	     Statin: Lipitor 20 mg daily  ·	     Beta Blocker: Continue Coreg 3.125 mg BID  ·	     Other Antianginals: Imdur 60 mg daily, amlodipine 10 mg daily  ·	     Aggressive cardiovascular risk reduction and lifestyle modification.  ·	Discontinue heparin drip    2. ACC/AHA stage B HFrEF  ·	GDMT  ·	     Beta Blocker: Coreg 3.125 mg BID  ·	     RAAS Inhibitor: Contraindicated in acute kidney injury  ·	     MRA: N/A  ·	     Diuretic: N/A  ·	     Other: Continue Imdur 60 mg daily and hydralazine 50 mg TID  ·	Strict I&O's  ·	Daily standing weights (if able)    Hypertension  ·	Continue Coreg 3.125 mg BID  ·	Continue Imdur 60 mg daily  ·	Continue hydralazine 50 mg TID  ·	Continue amlodipine 10 mg daily  ·	Continue clonidine 0.1 mg BID    Disposition:   ·	Return to bed.

## 2022-03-15 DIAGNOSIS — I42.8 OTHER CARDIOMYOPATHIES: ICD-10-CM

## 2022-03-15 LAB
ANION GAP SERPL CALC-SCNC: 9 MMOL/L — SIGNIFICANT CHANGE UP (ref 5–17)
BASOPHILS # BLD AUTO: 0.04 K/UL — SIGNIFICANT CHANGE UP (ref 0–0.2)
BASOPHILS NFR BLD AUTO: 1 % — SIGNIFICANT CHANGE UP (ref 0–2)
BUN SERPL-MCNC: 30.6 MG/DL — HIGH (ref 8–20)
CALCIUM SERPL-MCNC: 8.1 MG/DL — LOW (ref 8.6–10.2)
CHLORIDE SERPL-SCNC: 104 MMOL/L — SIGNIFICANT CHANGE UP (ref 98–107)
CO2 SERPL-SCNC: 25 MMOL/L — SIGNIFICANT CHANGE UP (ref 22–29)
CREAT SERPL-MCNC: 1.5 MG/DL — HIGH (ref 0.5–1.3)
EGFR: 52 ML/MIN/1.73M2 — LOW
EOSINOPHIL # BLD AUTO: 0.11 K/UL — SIGNIFICANT CHANGE UP (ref 0–0.5)
EOSINOPHIL NFR BLD AUTO: 2.7 % — SIGNIFICANT CHANGE UP (ref 0–6)
GLUCOSE SERPL-MCNC: 89 MG/DL — SIGNIFICANT CHANGE UP (ref 70–99)
HCT VFR BLD CALC: 32.3 % — LOW (ref 39–50)
HGB BLD-MCNC: 10.1 G/DL — LOW (ref 13–17)
IMM GRANULOCYTES NFR BLD AUTO: 0 % — SIGNIFICANT CHANGE UP (ref 0–1.5)
LYMPHOCYTES # BLD AUTO: 1.21 K/UL — SIGNIFICANT CHANGE UP (ref 1–3.3)
LYMPHOCYTES # BLD AUTO: 29.7 % — SIGNIFICANT CHANGE UP (ref 13–44)
MAGNESIUM SERPL-MCNC: 2 MG/DL — SIGNIFICANT CHANGE UP (ref 1.6–2.6)
MCHC RBC-ENTMCNC: 28.6 PG — SIGNIFICANT CHANGE UP (ref 27–34)
MCHC RBC-ENTMCNC: 31.3 GM/DL — LOW (ref 32–36)
MCV RBC AUTO: 91.5 FL — SIGNIFICANT CHANGE UP (ref 80–100)
MONOCYTES # BLD AUTO: 0.48 K/UL — SIGNIFICANT CHANGE UP (ref 0–0.9)
MONOCYTES NFR BLD AUTO: 11.8 % — SIGNIFICANT CHANGE UP (ref 2–14)
NEUTROPHILS # BLD AUTO: 2.24 K/UL — SIGNIFICANT CHANGE UP (ref 1.8–7.4)
NEUTROPHILS NFR BLD AUTO: 54.8 % — SIGNIFICANT CHANGE UP (ref 43–77)
PLATELET # BLD AUTO: 161 K/UL — SIGNIFICANT CHANGE UP (ref 150–400)
POTASSIUM SERPL-MCNC: 4.4 MMOL/L — SIGNIFICANT CHANGE UP (ref 3.5–5.3)
POTASSIUM SERPL-SCNC: 4.4 MMOL/L — SIGNIFICANT CHANGE UP (ref 3.5–5.3)
RBC # BLD: 3.53 M/UL — LOW (ref 4.2–5.8)
RBC # FLD: 14.6 % — HIGH (ref 10.3–14.5)
SARS-COV-2 RNA SPEC QL NAA+PROBE: SIGNIFICANT CHANGE UP
SODIUM SERPL-SCNC: 138 MMOL/L — SIGNIFICANT CHANGE UP (ref 135–145)
WBC # BLD: 4.08 K/UL — SIGNIFICANT CHANGE UP (ref 3.8–10.5)
WBC # FLD AUTO: 4.08 K/UL — SIGNIFICANT CHANGE UP (ref 3.8–10.5)

## 2022-03-15 PROCEDURE — 99232 SBSQ HOSP IP/OBS MODERATE 35: CPT

## 2022-03-15 PROCEDURE — 99233 SBSQ HOSP IP/OBS HIGH 50: CPT

## 2022-03-15 PROCEDURE — 93010 ELECTROCARDIOGRAM REPORT: CPT

## 2022-03-15 RX ORDER — SACUBITRIL AND VALSARTAN 24; 26 MG/1; MG/1
1 TABLET, FILM COATED ORAL
Refills: 0 | Status: DISCONTINUED | OUTPATIENT
Start: 2022-03-15 | End: 2022-03-16

## 2022-03-15 RX ORDER — ACETAMINOPHEN 500 MG
650 TABLET ORAL ONCE
Refills: 0 | Status: COMPLETED | OUTPATIENT
Start: 2022-03-15 | End: 2022-03-15

## 2022-03-15 RX ORDER — HEPARIN SODIUM 5000 [USP'U]/ML
5000 INJECTION INTRAVENOUS; SUBCUTANEOUS EVERY 12 HOURS
Refills: 0 | Status: DISCONTINUED | OUTPATIENT
Start: 2022-03-15 | End: 2022-03-16

## 2022-03-15 RX ORDER — OXYMETAZOLINE HYDROCHLORIDE 0.5 MG/ML
2 SPRAY NASAL
Refills: 0 | Status: DISCONTINUED | OUTPATIENT
Start: 2022-03-15 | End: 2022-03-16

## 2022-03-15 RX ADMIN — Medication 650 MILLIGRAM(S): at 16:01

## 2022-03-15 RX ADMIN — CARVEDILOL PHOSPHATE 3.12 MILLIGRAM(S): 80 CAPSULE, EXTENDED RELEASE ORAL at 05:30

## 2022-03-15 RX ADMIN — Medication 100 MILLIGRAM(S): at 13:16

## 2022-03-15 RX ADMIN — Medication 100 MILLIGRAM(S): at 05:30

## 2022-03-15 RX ADMIN — HEPARIN SODIUM 5000 UNIT(S): 5000 INJECTION INTRAVENOUS; SUBCUTANEOUS at 18:13

## 2022-03-15 RX ADMIN — Medication 81 MILLIGRAM(S): at 13:16

## 2022-03-15 RX ADMIN — Medication 650 MILLIGRAM(S): at 10:04

## 2022-03-15 RX ADMIN — CARVEDILOL PHOSPHATE 3.12 MILLIGRAM(S): 80 CAPSULE, EXTENDED RELEASE ORAL at 18:13

## 2022-03-15 RX ADMIN — SPIRONOLACTONE 25 MILLIGRAM(S): 25 TABLET, FILM COATED ORAL at 05:30

## 2022-03-15 RX ADMIN — AMLODIPINE BESYLATE 10 MILLIGRAM(S): 2.5 TABLET ORAL at 07:05

## 2022-03-15 RX ADMIN — Medication 3 MILLIGRAM(S): at 21:21

## 2022-03-15 RX ADMIN — OXYMETAZOLINE HYDROCHLORIDE 2 SPRAY(S): 0.5 SPRAY NASAL at 18:12

## 2022-03-15 RX ADMIN — Medication 650 MILLIGRAM(S): at 15:01

## 2022-03-15 RX ADMIN — ATORVASTATIN CALCIUM 20 MILLIGRAM(S): 80 TABLET, FILM COATED ORAL at 21:21

## 2022-03-15 RX ADMIN — SACUBITRIL AND VALSARTAN 1 TABLET(S): 24; 26 TABLET, FILM COATED ORAL at 21:21

## 2022-03-15 RX ADMIN — Medication 650 MILLIGRAM(S): at 11:04

## 2022-03-15 RX ADMIN — ISOSORBIDE MONONITRATE 60 MILLIGRAM(S): 60 TABLET, EXTENDED RELEASE ORAL at 13:16

## 2022-03-15 NOTE — PROGRESS NOTE ADULT - PROBLEM SELECTOR PLAN 2
GDMT- 165/85 - titrate medications as needed.         Beta Blocker: Coreg 3.125 mg BID       RAAS Inhibitor: Contraindicated in acute kidney injury       Other: Continue Imdur 60 mg daily and hydralazine 50 mg TID      Just added entresto - may reduce bp, - monitor vs as per protocol

## 2022-03-15 NOTE — PROGRESS NOTE ADULT - PROBLEM SELECTOR PROBLEM 3
NICM (nonischemic cardiomyopathy)
Polysubstance abuse

## 2022-03-15 NOTE — PROGRESS NOTE ADULT - ASSESSMENT
61 year old male with PMH HTN, 'heart problems', Depression, polysubstance abuse presented to [facility] seeking help for his drug addiction and he is ready to stop. Also states he's depressed, though denies any suicidal or homicidal ideation. In ER patient with elevated BP >200/100, Positive Troponin, Elevated SCr and toxicology screen positive for cocaine and THC. TTE with decreased EF 40-45%, Grade II DD and decreased RVSF. NST also positive and patient now being recommended admission for cardiac catheterization.        1. NSTEMI, in setting of crack cocaine abuse with Hypertensive urgency  S/p cardiac cath. Found to have non obstructive LAD stenosis   c/w Aspirin,   d/c plavix, Heparin full dose, clonidine   c/w Carvedilol started on 3/12, c/w  Amlodipine, Hydralazine and Nitrate      2. BRIANNA vs CKD. Interval improvement in renal functions - improved    Monitor renal functions  - Avoid Nephrotoxins    3. Depression with passive suicidal ideation    d/c  Constant observation  Seen by psych, does not required inpatient psych hospitalization   Plan for substance abuse rehab   Spoke to Lizbeth, likely in AM      4. Polysubstance abuse  Plan for substance abuse rehab        VTEp - Heparin sc

## 2022-03-15 NOTE — PROGRESS NOTE ADULT - REASON FOR ADMISSION
Elevated troponin
NSTEMI, Hypertensive urgency
Suicidal Ideation  Elevated troponin
NSTEMI, Hypertensive urgency

## 2022-03-15 NOTE — PROGRESS NOTE ADULT - PROBLEM SELECTOR PROBLEM 1
NSTEMI (non-ST elevation myocardial infarction)
Elevated troponin
NSTEMI (non-ST elevation myocardial infarction)
NSTEMI (non-ST elevation myocardial infarction)
Hypertension, unspecified type
NSTEMI (non-ST elevation myocardial infarction)
NSTEMI (non-ST elevation myocardial infarction)

## 2022-03-15 NOTE — PROGRESS NOTE ADULT - SUBJECTIVE AND OBJECTIVE BOX
[facility]                                                               Office: [address] [address]                                                              Telephone: [phone]. Fax:[phone]                                                                             PROGRESS NOTE  Reason for follow up:   Overnight: No new events.   Update: Patient with one run 6 beats VT this am.     Patient admitted through ED where he presented with suicidal ideation and found to have elevated troponin. A NST revealed a mod defect in the mid to distal inferior and lateral walls with partial reversibility.      ROS:  CV: Patient denied CP, palpitations or SOB  Resp: Patient denied SOB  Neuro: No complaints     	  Vitals:  T(C): 36.4 (03-12-22 @ 12:03), Max: 36.8 (03-11-22 @ 19:23)  HR: 75 (03-12-22 @ 12:03) (63 - 88)  BP: 134/76 (03-12-22 @ 12:03) (120/75 - 203/113)  RR: 18 (03-12-22 @ 12:03) (17 - 21)  SpO2: 95% (03-12-22 @ 12:03) (95% - 100%)    I&O's Summary    12 Mar 2022 06:01  -  12 Mar 2022 12:18  --------------------------------------------------------  IN: 47.5 mL / OUT: 150 mL / NET: -102.5 mL      Weight (kg): 69.4 (03-11 @ 15:16)      PHYSICAL EXAM:  Appearance: Comfortable. No acute distress  HEENT:  Head and neck: Atraumatic. Normocephalic.  Normal oral mucosa, PERRL, Neck is supple. No JVD,   Neurologic: A & O x 3, no focal deficits. EOMI.   Cardiovascular: Normal S1 S2, No murmur, rubs/gallops. No JVD, No edema  Respiratory: Lungs clear to auscultation  Lower Extremities: No edema  Psychiatry: Patient is calm. No agitation. Mood & affect flat. 1:1 remains at bedside.        CURRENT MEDICATIONS:  amLODIPine   Tablet 10 milliGRAM(s) Oral daily  cloNIDine 0.1 milliGRAM(s) Oral every 8 hours  hydrALAZINE 50 milliGRAM(s) Oral two times a day  hydrALAZINE Injectable 10 milliGRAM(s) IV Push every 3 hours PRN  isosorbide   mononitrate ER Tablet (IMDUR) 60 milliGRAM(s) Oral daily    aspirin  chewable  clopidogrel Tablet  heparin  Infusion.      DIAGNOSTIC TESTING:  < from: TTE Echo Complete w/ Contrast w/ Doppler (03.09.22 @ 18:57) >  Summary:   1. Severely enlarged left atrium.   2. There is severe concentric left ventricular hypertrophy.   3. Left ventricular ejection fraction, by visual estimation, is40 to   45%. Grade II diastolic dysfunction.   4. Right atrial enlargement.   5. Mildly reduced RV systolic function.   6. Mild aortic regurgitation.   7. There is no evidence of pericardial effusion.   8. In presence of uncontrolled blood pressure, echo features are   suggestive of hypertensive heart disease.    MD Tonya, RPVI Electronically signed on 3/10/2022 at 3:58:03 PM    < end of copied text >    < from: Nuclear Stress Test-Pharmacologic (03.11.22 @ 09:03) >  IMPRESSIONS:Abnormal Study  * Review of raw data shows: Minor motion artifact.  * There is a small, moderate defect in mid to distal  inferolateral wall that is partially reversible,  suggestive of mild ischemia.  * The left ventricle is dilated, NMVVP=857 ml. RV is  dilated. Inferior wall is hypokinetic. Overall post-stress  LVEF is 36%.  * Unable to walk on treadmill due to exercise intolerance  * Chest Pain: No chest pain with administration of  Regadenoson.  * Symptom: No Symptom.  * HR Response: Appropriate.  * BP Response: Appropriate.  * Heart Rhythm: Normal Sinus Rhythm.  * Baseline ECG: T wave abnormality in inferior and  anterolateral, inverted.  * ECG Abnormalities: There were no diagnostic changes.  * Arrhythmia: 3 VPDs occurred.    ------------------------------------------------------------------------      ------------------------------------------------------------------------    Confirmed on  3/11/2022 - 15:13:16 at [facility] by  Shaji Alves MD   Cardiology Fellow: Kaylin Bell    < end of copied text >    < from: Xray Chest 1 View- PORTABLE-Urgent (Xray Chest 1 View- PORTABLE-Urgent .) (03.09.22 @ 15:28) >  IMPRESSION:   No radiographic evidence of active chest disease.    --- End of Report ---    FERNANDA GARDNER MD; Attending Radiologist  This document has been electronically signed. Mar  9 2022  8:20PM    < end of copied text >      LABS:	 	  CARDIAC MARKERS ( 11 Mar 2022 20:40 )  x     / 0.40 ng/mL / x     / x     / x      p-BNP 11 Mar 2022 20:40: x    , CARDIAC MARKERS ( 11 Mar 2022 12:47 )  x     / 0.47 ng/mL / x     / x     / x      p-BNP 11 Mar 2022 12:47: x    , CARDIAC MARKERS ( 09 Mar 2022 18:25 )  x     / 0.06 ng/mL / x     / x     / x      p-BNP 09 Mar 2022 18:25: x    , CARDIAC MARKERS ( 09 Mar 2022 14:42 )  x     / 0.08 ng/mL / 538 U/L / x     / 17.9 ng/mL  p-BNP 09 Mar 2022 14:42: 4882 pg/mL                          10.3   4.44  )-----------( 163      ( 12 Mar 2022 04:25 )             32.3     03-12    139  |  104  |  31.0<H>  ----------------------------<  94  4.0   |  24.0  |  1.72<H>    Ca    8.2<L>      12 Mar 2022 04:25  Mg     2.0     03-12    TPro  5.9<L>  /  Alb  3.0<L>  /  TBili  0.4  /  DBili  x   /  AST  19  /  ALT  12  /  AlkPhos  65  03-12    proBNP: Serum Pro-Brain Natriuretic Peptide: 4882 pg/mL (03-09 @ 14:42)    Lipid Profile: Date: 03-12 @ 04:25  Total cholesterol 128; Direct LDL: --; HDL: 63; Triglycerides:45    HgA1c:   TSH:       TELEMETRY: Sinus Rhythm with occasional PVCs; one 6 beat run of VT at 0730 this am.   ECG: Sinus Rhythm with inverted T waves in the inferior and Lateral leads, LAE and LVH    Assessment and Plan:   · Assessment	   62 yo male history of PTSD, bipolar depression, and anxiety presents stating that he is experiencing thoughts of wanting to end his life. He states that he is tired of his life. Patient does not endorse any specific plan with which to end his life. Patient states that he last snorted cocaine yesterday, he has snorted cocaine in the past. Besides cocaine, patient also uses marijuana. Denies other drug or alcohol use. Denies fever/chills, cough, sore throat, sob, cp, abd pain, n/v/d, urinary symptoms, dizziness, headache, focal weakness/numbness/tingling in extremities. Pt found to have increased troponin, which is now down trending and also has EKG with T wave changes.     He remains in the ED. NM stress performed today, pending read.      Problem/Plan - 1:  ·  Problem: Elevated troponin.   ·  Plan: .  - Discussed LHC with patient who stated he had a LHC approx. one year ago at the [facility] in or near [facility].  This NP contacted [facility] and was told to call back on Monday as the medical records department is closed on the weekends.  Phone [phone] ext [phone]  -SBP noted 134-154  - Continue amlodipine 10mg PO daily  - Continue hydralazine to 50 mg PO BID,   - HCTZ on hold secondary to elevated Cr 1.72 today  - Continue to follow BMP  -Maintain serum K+ > 4.0 and Mag > 2.0  - BB deferred secondary to cocaine use.     Problem/Plan - 2:  ·  Problem: Hypertension, unspecified type.   ·  Plan: .  - Troponin down trending - will repeat today  - Denies CP or SOB  - TTE - 40 to 45%. Grade II diastolic dysfunction. hypertensive heart disease   - NM Stress performed, abnormal - moderate defect mid to distal inferior and lateral wall partially reversal.  - C on Monday  - Keep K >4, Mg >2 to for arrhythmia ppx  -Continue telemetry monitoring.    Assessment and recommendations are final when note is signed by the attending.      	      
Left dominant system.     Mild to moderate proximal LAD disease.   normal LVEDP.   Medical management. 
Patient is a 61y old  Male who presents with a chief complaint of NSTEMI, Hypertensive urgency (12 Mar 2022 12:14)      INTERVAL HPI/OVERNIGHT EVENTS: seen and examined. Chest pain improved. No other complains.     MEDICATIONS  (STANDING):  amLODIPine   Tablet 10 milliGRAM(s) Oral daily  aspirin  chewable 81 milliGRAM(s) Oral daily  cloNIDine 0.1 milliGRAM(s) Oral every 8 hours  clopidogrel Tablet 75 milliGRAM(s) Oral daily  heparin  Infusion.  Unit(s)/Hr (8 mL/Hr) IV Continuous <Continuous>  hydrALAZINE 50 milliGRAM(s) Oral two times a day  isosorbide   mononitrate ER Tablet (IMDUR) 60 milliGRAM(s) Oral daily    MEDICATIONS  (PRN):  heparin   Injectable 4100 Unit(s) IV Push every 6 hours PRN For aPTT less than 40  hydrALAZINE Injectable 10 milliGRAM(s) IV Push every 3 hours PRN SBP/DBP>180/95      Allergies    No Known Allergies    Intolerances        REVIEW OF SYSTEMS:  CONSTITUTIONAL: No fever, weight loss, or fatigue  RESPIRATORY: No cough, wheezing, chills or hemoptysis; No shortness of breath  CARDIOVASCULAR: No chest pain, palpitations, dizziness, or leg swelling  GASTROINTESTINAL: No abdominal or epigastric pain. No nausea, vomiting, or hematemesis; No diarrhea or constipation. No melena or hematochezia.  NEUROLOGICAL: No headaches, memory loss, loss of strength, numbness, or tremors  MUSCULOSKELETAL: No joint pain or swelling; No muscle, back, or extremity pain      Vital Signs Last 24 Hrs  T(C): 36.4 (12 Mar 2022 12:03), Max: 36.8 (11 Mar 2022 19:23)  T(F): 97.6 (12 Mar 2022 12:03), Max: 98.2 (11 Mar 2022 19:23)  HR: 75 (12 Mar 2022 12:03) (63 - 88)  BP: 134/76 (12 Mar 2022 12:03) (120/75 - 203/113)  BP(mean): 93 (12 Mar 2022 08:00) (93 - 93)  RR: 18 (12 Mar 2022 12:03) (17 - 21)  SpO2: 95% (12 Mar 2022 12:03) (95% - 100%)    PHYSICAL EXAM:  GENERAL: NAD,   HEAD:  Atraumatic, Normocephalic  EYES: EOMI, PERRLA, conjunctiva and sclera clear  NECK: Supple, No JVD, Normal thyroid  NERVOUS SYSTEM:  Alert & Oriented X3, No gross focal deficits  CHEST/LUNG: Clear to percussion bilaterally; No rales, rhonchi, wheezing, or rubs  HEART: Regular rate and rhythm; No murmurs, rubs, or gallops  ABDOMEN: Soft, Nontender, Nondistended; Bowel sounds present  EXTREMITIES:  No clubbing, cyanosis, or edema  SKIN: No rashes or lesions    LABS:                        10.3   4.44  )-----------( 163      ( 12 Mar 2022 04:25 )             32.3     03-12    139  |  104  |  31.0<H>  ----------------------------<  94  4.0   |  24.0  |  1.72<H>    Ca    8.2<L>      12 Mar 2022 04:25  Mg     2.0     03-12    TPro  5.9<L>  /  Alb  3.0<L>  /  TBili  0.4  /  DBili  x   /  AST  19  /  ALT  12  /  AlkPhos  65  03-12    PTT - ( 12 Mar 2022 11:30 )  PTT:53.5 sec    CAPILLARY BLOOD GLUCOSE          RADIOLOGY & ADDITIONAL TESTS:    Imaging Personally Reviewed:  [x ] YES  [ ] NO    Consultant(s) Notes Reviewed:  [x ] YES  [ ] NO    Care Discussed with Consultants/Other Providersx [ x] YES  [ ] NO    Plan of Care discussed with Housestaff [ x]YES [ ] NO
                                                                         Department of Cardiology                                                                  [facility] [address] [address]                                                            Telephone: [phone]. Fax:[phone]                                                                           Cardiac Catheterization Note       Subjective:  61y  Male who had a left heart catheterization which showed (official report pending):       LM: No significant CAD       LAD: 40% proximal stenosis       LCX: No significant CAD       RCA: No significant CAD         Access: Right radial artery       Hemostasis: Radial band       Total Contrast: 80 mL Omnipaque       Total Heparin: 4000 units       Antiplatelet Given: N/A    PAST MEDICAL & SURGICAL HISTORY:  Hypertension, unspecified type  Heart problem  Polysubstance abuse  Bipolar depression  HTN (hypertension)    FAMILY HISTORY:  FH: diabetes mellitus (Father, Mother)  FH: HTN (hypertension) (Mother)    Home Medications: N/A    Patient is a 61y old  Male who presents with a chief complaint of NSTEMI, Hypertensive urgency (14 Mar 2022 15:35)    HEALTH ISSUES - PROBLEM Dx:  NSTEMI (non-ST elevation myocardial infarction)  Hypertensive urgency  Polysubstance abuse  Bipolar depression    HPI: 62 y/o M with PMH HTN, PTSD, Bipolar depression, anxiety, Hep C (?) presents to [facility] with suicidal ideation. Patient also reports having used cocaine and marijuana in the past day. Besides cocaine, patient also uses marijuana. Denies other drug or alcohol use. Denies fever/chills, cough, sore throat, sob, cp, abd pain, n/v/d, urinary symptoms, dizziness, headache, focal weakness/numbness/tingling in extremities. HPI obtained from records due to patient lethargy after administration of ativan.    General: No fatigue, no fevers/chills  Respiratory: No dyspnea, no cough, no wheeze  CV: No chest pain, no palpitations  Abd: No nausea  Neuro: No headache, no dizziness    No Known Allergies    Objective:  Vital Signs Last 24 Hrs  T(C): 36.6 (14 Mar 2022 07:57), Max: 36.7 (13 Mar 2022 20:00)  T(F): 97.8 (14 Mar 2022 07:57), Max: 98.1 (13 Mar 2022 20:00)  HR: 60 (14 Mar 2022 12:25) (58 - 66)  BP: 150/92 (14 Mar 2022 12:25) (130/92 - 151/85)  BP(mean): 104 (14 Mar 2022 04:39) (98 - 108)  RR: 18 (14 Mar 2022 07:57) (17 - 18)  SpO2: 100% (14 Mar 2022 07:57) (97% - 100%)    CM: SR  Neuro: A&OX3, CN 2-12 intact  HEENT: NC, AT  Lungs: CTA B/L  CV: S1, S2, no murmur, RRR  Abd: Soft  Extremity: Right radial band: no bleeding, fingers warm with good cap refil    Echo:   Left Ventricle: The left ventricular internal cavity size is normal. Global LV systolic function was mildly decreased. Left ventricular ejection fraction, by visual estimation, is 40 to 45%. Spectral Doppler shows pseudonormal pattern of left ventricular myocardial filling (Grade II diastolic dysfunction).  Right Ventricle: The right ventricular size is normal. RV systolic function is mildly reduced.  Left Atrium: Severely enlarged left atrium.  Right Atrium: Right atrial enlargement.  Pericardium: There is no evidence of pericardial effusion.  Mitral Valve: The mitral valve is normal in structure. Mild thickening of the anterior mitral valve leaflet. Mobility is mildly decreased for the posterior leaflet. Trace mitral valve regurgitation is seen.  Tricuspid Valve: The tricuspid valve is normal in structure. Trivial tricuspid regurgitation is visualized. Adequate TR velocity was not obtained to accurately assess RVSP.  Aortic Valve: The aortic valve is trileaflet. Sclerotic aortic valve with normal opening. Peak transaortic gradient equals 3.2 mmHg, mean transaortic gradient equals 1.1 mmHg, the calculated aortic valve area equals 2.55 cm² by the continuity equation consistent with normally opening aortic valve. Mild aortic valve regurgitation is seen.  Pulmonic Valve: The pulmonic valve was not well visualized.  Aorta: The aortic root is normal in size and structure.  Pulmonary Artery: The pulmonary artery is not well seen.  Venous: The inferior vena cava was normal sized, with respiratory size variation greater than 50%.    Nuclear Stress Test:   SYMPTOMS/FINDINGS: Symptoms: No Symptom. Chest pain: No chest pain with administration of Regadenoson  ECG ABNORMALITIES DURING/AFTER STRESS: Abnormalities: There were no diagnostic changes.  NUCLEAR FINDINGS: Review of raw data shows: Minor motion artifact. There is a small, moderate defect in mid to distal inferolateral wall that is partially reversible, suggestive of mild ischemia.                        9.8    3.49  )-----------( 164      ( 14 Mar 2022 07:03 )             31.1     03-13    146  |  97    |  27.6  ----------------------------<  94  4.8   |  23.0  |  1.43    Ca    7.8      13 Mar 2022 09:19  Phos  3.5     03-13  Mg     2.0     03-13    PTT - ( 14 Mar 2022 07:04 )  PTT:72.2 sec  
                                                         [facility]                                                         CARDIOLOGY AT [facility]                                                                  [address] [address]                                                         Telephone: [phone]. Fax:[phone]                                                                             PROGRESS NOTE    Reason for follow up:     CAD  Update:   61y  Male who had a left heart catheterization which showed (official report pending):       LM: No significant CAD       LAD: 40% proximal stenosis       LCX: No significant CAD       RCA: No significant CAD    Review of symptoms:   Cardiac:  No chest pain. No dyspnea. No palpitations.  Respiratory: no cough. No dyspnea  Gastrointestinal: No diarrhea. No abdominal pain. No bleeding.   Neuro: No focal neuro complaints.    Vitals:  T(C): 36.7 (03-15-22 @ 09:00), Max: 36.7 (03-15-22 @ 09:00)  HR: 68 (03-15-22 @ 13:15) (64 - 81)  BP: 146/96 (03-15-22 @ 13:15) (113/63 - 181/95)  RR: 18 (03-15-22 @ 09:00) (18 - 20)  SpO2: 98% (03-15-22 @ 09:00) (94% - 100%)  Wt(kg): --  I&O's Summary    14 Mar 2022 07:01  -  15 Mar 2022 07:00  --------------------------------------------------------  IN: 66.5 mL / OUT: 1400 mL / NET: -1333.5 mL  Weight (kg): 68.1 (03-14 @ 18:20)    PHYSICAL EXAM:  Appearance: Comfortable. No acute distress  HEENT:  Atraumatic. Normocephalic.  Normal oral mucosa  Neurologic: A & O x 3, no gross focal deficits.  Cardiovascular: RRR S1 S2, No murmur, no rubs/gallops. No JVD  Respiratory: Lungs clear to auscultation, unlabored   Gastrointestinal:  Soft, Non-tender, + BS  Lower Extremities: 2+ Peripheral Pulses, No clubbing, cyanosis, or edema  Psychiatry: Patient is calm. No agitation.   Skin: warm and dry.    CURRENT CARDIAC MEDICATIONS:  amLODIPine   Tablet 10 milliGRAM(s) Oral daily  carvedilol 3.125 milliGRAM(s) Oral every 12 hours  hydrALAZINE 100 milliGRAM(s) Oral three times a day  hydrALAZINE Injectable 10 milliGRAM(s) IV Push every 3 hours PRN  isosorbide   mononitrate ER Tablet (IMDUR) 60 milliGRAM(s) Oral daily  spironolactone 25 milliGRAM(s) Oral daily    CURRENT OTHER MEDICATIONS:  melatonin 3 milliGRAM(s) Oral at bedtime  atorvastatin 20 milliGRAM(s) Oral at bedtime  aspirin  chewable 81 milliGRAM(s) Oral daily  heparin   Injectable 5000 Unit(s) SubCutaneous every 12 hours  oxymetazoline 0.05% Nasal Spray 2 Spray(s) Both Nostrils two times a day, Stop order after: 2 Days PRN epitaxis    LABS:	 	  ( 13 Mar 2022 09:19 )  Troponin T  0.42<H>,  CPK  124  , CKMB  X    , BNP X      , ( 11 Mar 2022 20:40 )  Troponin T  0.40<H>,  CPK  X    , CKMB  X    , BNP X        , ( 11 Mar 2022 12:47 )  Troponin T  0.47<H>,  CPK  X    , CKMB  X    , BNP X                              10.1   4.08  )-----------( 161      ( 15 Mar 2022 06:33 )             32.3   03-15  138  |  104  |  30.6<H>  ----------------------------<  89  4.4   |  25.0  |  1.50<H>  Ca    8.1<L>      15 Mar 2022 06:33  Mg     2.0     03-15  PT/INR/PTT ( 14 Mar 2022 07:04 )                       :                       :      X            :       72.2                  .        .                   .              .           .       X           .                                       Lipid Profile: Date: 03-12 @ 04:25  Total cholesterol 128; Direct LDL: --; HDL: 63; Triglycerides:45  TELEMETRY:   St no acute alarms noted.    COMPARISON: None available.  TECHNIQUE: Sonography of the kidneys and bladder.  FINDINGS:  Right kidney: 10.8 cm. Increased echogenicity. No renal mass,   hydronephrosis or calculi. 2 cm cyst.  Left kidney: 9.0 cm. Increased echogenicity. No renal mass,   hydronephrosis or calculi. 0.9 cm cyst.  Urinary bladder: Within normal limits. Volume 16 mL.  IMPRESSION:  Bilaterally increased renal cortical echogenicity compatible with medical   renal disease.  No hydronephrosis.        
Patient is a 61y old  Male who presents with a chief complaint of NSTEMI, Hypertensive urgency (12 Mar 2022 12:47)      INTERVAL HPI/OVERNIGHT EVENTS: seen and examined. No complains.   Denies any suicidal ideation or attempts.     MEDICATIONS  (STANDING):  amLODIPine   Tablet 10 milliGRAM(s) Oral daily  aspirin  chewable 81 milliGRAM(s) Oral daily  carvedilol 3.125 milliGRAM(s) Oral every 12 hours  cloNIDine 0.1 milliGRAM(s) Oral every 8 hours  clopidogrel Tablet 75 milliGRAM(s) Oral daily  heparin  Infusion.  Unit(s)/Hr (8 mL/Hr) IV Continuous <Continuous>  hydrALAZINE 50 milliGRAM(s) Oral two times a day  isosorbide   mononitrate ER Tablet (IMDUR) 60 milliGRAM(s) Oral daily  melatonin 3 milliGRAM(s) Oral at bedtime    MEDICATIONS  (PRN):  heparin   Injectable 4100 Unit(s) IV Push every 6 hours PRN For aPTT less than 40  hydrALAZINE Injectable 10 milliGRAM(s) IV Push every 3 hours PRN SBP/DBP>180/95      Allergies    No Known Allergies    Intolerances        REVIEW OF SYSTEMS:  CONSTITUTIONAL: No fever, weight loss, or fatigue  RESPIRATORY: No cough, wheezing, chills or hemoptysis; No shortness of breath  CARDIOVASCULAR: No chest pain, palpitations, dizziness, or leg swelling  GASTROINTESTINAL: No abdominal or epigastric pain. No nausea, vomiting, or hematemesis; No diarrhea or constipation. No melena or hematochezia.  NEUROLOGICAL: No headaches, memory loss, loss of strength, numbness, or tremors  MUSCULOSKELETAL: No joint pain or swelling; No muscle, back, or extremity pain      Vital Signs Last 24 Hrs  T(C): 36.6 (13 Mar 2022 09:33), Max: 36.8 (12 Mar 2022 16:00)  T(F): 97.8 (13 Mar 2022 09:33), Max: 98.2 (12 Mar 2022 16:00)  HR: 68 (13 Mar 2022 09:33) (62 - 76)  BP: 143/78 (13 Mar 2022 09:33) (128/77 - 160/90)  BP(mean): 99 (12 Mar 2022 20:00) (99 - 110)  RR: 18 (13 Mar 2022 09:33) (16 - 18)  SpO2: 100% (13 Mar 2022 09:33) (95% - 100%)    PHYSICAL EXAM:  GENERAL: NAD,   HEAD:  Atraumatic, Normocephalic  EYES: EOMI, PERRLA, conjunctiva and sclera clear  NECK: Supple, No JVD, Normal thyroid  NERVOUS SYSTEM:  Alert & Oriented X3, No gross focal deficits  CHEST/LUNG: Clear to percussion bilaterally; No rales, rhonchi, wheezing, or rubs  HEART: Regular rate and rhythm; No murmurs, rubs, or gallops  ABDOMEN: Soft, Nontender, Nondistended; Bowel sounds present  EXTREMITIES:  No clubbing, cyanosis, or edema  SKIN: No rashes or lesions    LABS:                        10.0   3.81  )-----------( 173      ( 13 Mar 2022 09:19 )             31.5     03-13    146<H>  |  97<L>  |  27.6<H>  ----------------------------<  94  4.8   |  23.0  |  1.43<H>    Ca    7.8<L>      13 Mar 2022 09:19  Phos  3.5     03-13  Mg     2.0     03-13    TPro  5.9<L>  /  Alb  3.0<L>  /  TBili  0.4  /  DBili  x   /  AST  19  /  ALT  12  /  AlkPhos  65  03-12    PTT - ( 13 Mar 2022 09:19 )  PTT:58.5 sec    CAPILLARY BLOOD GLUCOSE          RADIOLOGY & ADDITIONAL TESTS:    Imaging Personally Reviewed:  [ ] YES  [ ] NO    Consultant(s) Notes Reviewed:  [ x] YES  [ ] NO    Care Discussed with Consultants/Other Providers [ x] YES  [ ] NO    Plan of Care discussed with Housestaff [x ]YES [ ] NO
                                                                    [facility]                                                               Office: [address] [address]                                                              Telephone: [phone]. Fax:[phone]                                                                             PROGRESS NOTE  Reason for follow up: NSTEMI, cardiomyopathy  Overnight: No new events.   Update: laying flat in bed calm cooperative with 1:1 constant observation; still with 7 beats NSVT on tele added low dose carvedilol last night       Review of symptoms:   Cardiac:  No chest pain. No dyspnea. No palpitations.  Respiratory: No cough. No dyspnea  Gastrointestinal: No diarrhea. No abdominal pain. No bleeding.     Past medical history: No updates.   	  Vital Signs Last 24 Hrs  T(C): 36.6 (03-13-22 @ 09:33), Max: 36.8 (03-12-22 @ 16:00)  T(F): 97.8 (03-13-22 @ 09:33), Max: 98.2 (03-12-22 @ 16:00)  HR: 68 (03-13-22 @ 09:33) (62 - 76)  BP: 143/78 (03-13-22 @ 09:33) (128/77 - 160/90)  BP(mean): 99 (03-12-22 @ 20:00) (99 - 110)  RR: 18 (03-13-22 @ 09:33) (16 - 18)  SpO2: 100% (03-13-22 @ 09:33) (95% - 100%)  I&O's Summary    12 Mar 2022 06:01  -  13 Mar 2022 07:00  --------------------------------------------------------  IN: 104.5 mL / OUT: 352 mL / NET: -247.5 mL    13 Mar 2022 07:01  -  13 Mar 2022 11:21  --------------------------------------------------------  IN: 100 mL / OUT: 0 mL / NET: 100 mL          PHYSICAL EXAM:  Appearance: Comfortable. No acute distress  HEENT:  Head and neck: Atraumatic. Normocephalic.  Normal oral mucosa, PERRL, Neck is supple. No JVD, No carotid bruit.   Neurologic: A&Ox 3, no focal deficits. EOMI, Cranial nerves are intact.  Lymphatic: No cervical lymphadenopathy  Cardiovascular: Normal S1 S2, No murmur, rubs/gallops. No JVD, No edema  Respiratory: Lungs clear to auscultation  Gastrointestinal:  Soft, Non-tender, + BS  Lower Extremities: No edema  Psychiatry: Patient is calm. No agitation. Mood & affect appropriate  Skin: No rashes/ecchymoses/cyanosis/ulcers visualized on the face, hands or feet.      CURRENT MEDICATIONS:  MEDICATIONS  (STANDING):  amLODIPine   Tablet 10 milliGRAM(s) Oral daily  aspirin  chewable 81 milliGRAM(s) Oral daily  carvedilol 3.125 milliGRAM(s) Oral every 12 hours  cloNIDine 0.1 milliGRAM(s) Oral every 8 hours  clopidogrel Tablet 75 milliGRAM(s) Oral daily  heparin  Infusion.  Unit(s)/Hr (8 mL/Hr) IV Continuous <Continuous>  hydrALAZINE 50 milliGRAM(s) Oral two times a day  isosorbide   mononitrate ER Tablet (IMDUR) 60 milliGRAM(s) Oral daily  melatonin 3 milliGRAM(s) Oral at bedtime    MEDICATIONS  (PRN):  heparin   Injectable 4100 Unit(s) IV Push every 6 hours PRN For aPTT less than 40  hydrALAZINE Injectable 10 milliGRAM(s) IV Push every 3 hours PRN SBP/DBP>180/95      DIAGNOSTIC TESTING:  [ ] Echocardiogram:   < from: TTE Echo Complete w/ Contrast w/ Doppler (03.09.22 @ 18:57) >    Summary:   1. Severely enlarged left atrium.   2. There is severe concentric left ventricular hypertrophy.   3. Left ventricular ejection fraction, by visual estimation, is40 to   45%. Grade II diastolic dysfunction.   4. Right atrial enlargement.   5. Mildly reduced RV systolic function.   6. Mild aortic regurgitation.   7. There is no evidence of pericardial effusion.   8. In presence of uncontrolled blood pressure, echo features are   suggestive of hypertensive heart disease.    < end of copied text >    [ ]  Catheterization:  [ ] Stress Test:    < from: Nuclear Stress Test-Pharmacologic (03.11.22 @ 09:03) >    IMPRESSIONS:Abnormal Study  * Review of raw data shows: Minor motion artifact.  * There is a small, moderate defect in mid to distal  inferolateral wall that is partially reversible,  suggestive of mild ischemia.  * The left ventricle is dilated, JCQLG=321 ml. RV is  dilated. Inferior wall is hypokinetic. Overall post-stress  LVEF is 36%.  * Unable to walk on treadmill due to exercise intolerance  * Chest Pain: No chest pain with administration of  Regadenoson.  * Symptom: No Symptom.  * HR Response: Appropriate.  * BP Response: Appropriate.  * Heart Rhythm: Normal Sinus Rhythm.  * Baseline ECG: T wave abnormality in inferior and  anterolateral, inverted.  * ECG Abnormalities: There were no diagnostic changes.  * Arrhythmia: 3 VPDs occurred.    < end of copied text >      Labs:                        10.0   3.81  )-----------( 173      ( 13 Mar 2022 09:19 )             31.5     03-13    146<H>  |  97<L>  |  27.6<H>  ----------------------------<  94  4.8   |  23.0  |  1.43<H>    Ca    7.8<L>      13 Mar 2022 09:19  Phos  3.5     03-13  Mg     2.0     03-13    TPro  5.9<L>  /  Alb  3.0<L>  /  TBili  0.4  /  DBili  x   /  AST  19  /  ALT  12  /  AlkPhos  65  03-12     13 Mar 2022 09:19 Troponin 0.42 ng/mL / Creatine Kinase 124 U/L /  CKMB x     / CPK Mass Assay % x       11 Mar 2022 20:40 Troponin 0.40 ng/mL / Creatine Kinase x     /  CKMB x     / CPK Mass Assay % x       11 Mar 2022 12:47 Troponin 0.47 ng/mL / Creatine Kinase x     /  CKMB x     / CPK Mass Assay % x          Serum Pro-Brain Natriuretic Peptide: 4882 pg/mL (03-09-22 @ 14:42)    Cholesterol 128 mg/dL; Direct LDL --; HDL Cholesterol, Serum 63 mg/dL; HDL/ Total Cholesterol Ratio Measurement --; Total Cholesterol/ HDL Ratio Measurement --; Triglycerides, Serum 45 mg/dL      TELEMETRY: sinus with 7 beats NSVT
                                                         [facility]                                                         CARDIOLOGY AT [facility]                                                                  [address] [facility] [place] [address]                                                         Telephone: [phone]. Fax:[phone]                                                                             PROGRESS NOTE    Reason for follow up: elevated troponin  Update: pt refusing stress test today      Review of symptoms:   Cardiac:  No chest pain. No dyspnea. No palpitations.  Respiratory: no cough. No dyspnea  Gastrointestinal: No diarrhea. No abdominal pain. No bleeding.   Neuro: No focal neuro complaints.    Vitals:  T(C): 36.5 (03-10-22 @ 11:08), Max: 36.9 (03-09-22 @ 23:19)  HR: 60 (03-10-22 @ 11:08) (60 - 88)  BP: 201/117 (03-10-22 @ 11:08) (160/96 - 231/145)  RR: 16 (03-10-22 @ 11:08) (15 - 18)  SpO2: 100% (03-10-22 @ 11:08) (98% - 100%)  Wt(kg): --  I&O's Summary    Weight (kg): 68 (03-09 @ 13:33)    PHYSICAL EXAM:  Appearance: Comfortable. No acute distress  HEENT:  Atraumatic. Normocephalic.  Normal oral mucosa  Neurologic: A & O x 3, no gross focal deficits.   Cardiovascular: RRR S1 S2, No murmur, no rubs/gallops. No JVD  Respiratory: Lungs clear to auscultation, unlabored   Gastrointestinal:  Soft, Non-tender, + BS  Lower Extremities: 2+ Peripheral Pulses, No clubbing, cyanosis, or edema  Psychiatry: Patient is calm. No agitation. Pt uncooperative   Skin: warm and dry.    CURRENT CARDIAC MEDICATIONS:      CURRENT OTHER MEDICATIONS:      LABS:	 	  ( 09 Mar 2022 18:25 )  Troponin T  0.06 ,  CPK  X    , CKMB  X    , BNP X        , ( 09 Mar 2022 14:42 )  Troponin T  0.08<H>,  CPK  538<H>, CKMB  X    , BNP 4882<H>                              13.9   3.87  )-----------( 216      ( 09 Mar 2022 14:42 )             45.1     03-09    139  |  102  |  28.3<H>  ----------------------------<  100<H>  5.2   |  26.0  |  2.05<H>    Ca    9.2      09 Mar 2022 14:42  Mg     2.2     03-09    TPro  8.8<H>  /  Alb  4.2  /  TBili  0.5  /  DBili  x   /  AST  43<H>  /  ALT  24  /  AlkPhos  97  03-09    PT/INR/PTT ( 09 Mar 2022 14:42 )                       :                       :      11.8         :       33.6                  .        .                   .              .           .       1.02        .                                       Lipid Profile:   HgA1c:   TSH:     TELEMETRY: SR 60's, PVC's, longest run of VT 15 beats  ECG: NSR 69, normal axis, prolonged QTc 530, T wave changes     DIAGNOSTIC TESTING:  [ ] Echocardiogram:   [ ]  Catheterization:  [ ] Stress Test:    OTHER: 	      
Patient is a 61y old  Male who presents with a chief complaint of NSTEMI, Hypertensive urgency (14 Mar 2022 15:43)      INTERVAL HPI/OVERNIGHT EVENTS: seen and examined. No complains.   No events overnight     MEDICATIONS  (STANDING):  amLODIPine   Tablet 10 milliGRAM(s) Oral daily  aspirin  chewable 81 milliGRAM(s) Oral daily  atorvastatin 20 milliGRAM(s) Oral at bedtime  carvedilol 3.125 milliGRAM(s) Oral every 12 hours  hydrALAZINE 100 milliGRAM(s) Oral three times a day  isosorbide   mononitrate ER Tablet (IMDUR) 60 milliGRAM(s) Oral daily  melatonin 3 milliGRAM(s) Oral at bedtime  spironolactone 25 milliGRAM(s) Oral daily    MEDICATIONS  (PRN):  hydrALAZINE Injectable 10 milliGRAM(s) IV Push every 3 hours PRN SBP/DBP>180/95  oxymetazoline 0.05% Nasal Spray 2 Spray(s) Both Nostrils two times a day PRN epitaxis      Allergies    No Known Allergies    Intolerances        REVIEW OF SYSTEMS:  CONSTITUTIONAL: No fever, weight loss, or fatigue  RESPIRATORY: No cough, wheezing, chills or hemoptysis; No shortness of breath  CARDIOVASCULAR: No chest pain, palpitations, dizziness, or leg swelling  GASTROINTESTINAL: No abdominal or epigastric pain. No nausea, vomiting, or hematemesis; No diarrhea or constipation. No melena or hematochezia.  NEUROLOGICAL: No headaches, memory loss, loss of strength, numbness, or tremors  MUSCULOSKELETAL: No joint pain or swelling; No muscle, back, or extremity pain      Vital Signs Last 24 Hrs  T(C): 36.7 (15 Mar 2022 09:00), Max: 36.7 (15 Mar 2022 09:00)  T(F): 98 (15 Mar 2022 09:00), Max: 98 (15 Mar 2022 09:00)  HR: 68 (15 Mar 2022 13:15) (64 - 81)  BP: 146/96 (15 Mar 2022 13:15) (113/63 - 181/95)  BP(mean): --  RR: 18 (15 Mar 2022 09:00) (18 - 20)  SpO2: 98% (15 Mar 2022 09:00) (94% - 100%)    PHYSICAL EXAM:  GENERAL: NAD,   HEAD:  Atraumatic, Normocephalic  EYES: EOMI, PERRLA, conjunctiva and sclera clear  NECK: Supple, No JVD, Normal thyroid  NERVOUS SYSTEM:  Alert & Oriented X3, No gross focal deficits  CHEST/LUNG: Clear to percussion bilaterally; No rales, rhonchi, wheezing, or rubs  HEART: Regular rate and rhythm; No murmurs, rubs, or gallops  ABDOMEN: Soft, Nontender, Nondistended; Bowel sounds present  EXTREMITIES:  No clubbing, cyanosis, or edema  SKIN: No rashes or lesions    LABS:                        10.1   4.08  )-----------( 161      ( 15 Mar 2022 06:33 )             32.3     03-15    138  |  104  |  30.6<H>  ----------------------------<  89  4.4   |  25.0  |  1.50<H>    Ca    8.1<L>      15 Mar 2022 06:33  Mg     2.0     03-15      PTT - ( 14 Mar 2022 07:04 )  PTT:72.2 sec    CAPILLARY BLOOD GLUCOSE          RADIOLOGY & ADDITIONAL TESTS:    Imaging Personally Reviewed:  [ ] YES  [ ] NO    Consultant(s) Notes Reviewed:  [x ] YES  [ ] NO    Care Discussed with Consultants/Other Providers [x ] YES  [ ] NO    Plan of Care discussed with Housestaff [ ]YES [ ] NO
                                                         [facility]                                                         CARDIOLOGY AT [facility]                                                                  [address] [facility] [place] [place] [address]                                                         Telephone: [phone]. Fax:[phone]                                                                             PROGRESS NOTE    Reason for follow up: elevated troponin, NM stress  Update: patient is resting comfortable in ED, now with 1:1 at bedside      Review of symptoms:   Cardiac:  No chest pain. No dyspnea. No palpitations.  Respiratory: no cough. No dyspnea  Gastrointestinal: No diarrhea. No abdominal pain. No bleeding.   Neuro: No focal neuro complaints.    Vitals:  T(C): 36.6 (03-11-22 @ 11:08), Max: 36.8 (03-11-22 @ 09:00)  HR: 78 (03-11-22 @ 11:08) (72 - 80)  BP: 211/115 (03-11-22 @ 11:08) (162/96 - 219/109)  RR: 18 (03-11-22 @ 11:08) (18 - 18)  SpO2: 98% (03-11-22 @ 11:08) (98% - 100%)  Wt(kg): --  I&O's Summary    Weight (kg): 68 (03-09 @ 13:33)    PHYSICAL EXAM:  Appearance: Comfortable. No acute distress  HEENT:  Atraumatic. Normocephalic.  Normal oral mucosa  Neurologic: A & O x 3, no gross focal deficits.  Cardiovascular: RRR S1 S2, No murmur, no rubs/gallops. No JVD  Respiratory: Lungs clear to auscultation, unlabored   Gastrointestinal:  Soft, Non-tender, + BS  Lower Extremities: 2+ Peripheral Pulses, No clubbing, cyanosis, or edema  Psychiatry: Patient is calm. No agitation.   Skin: warm and dry.    CURRENT CARDIAC MEDICATIONS:  amLODIPine   Tablet 10 milliGRAM(s) Oral daily  hydrALAZINE 50 milliGRAM(s) Oral two times a day  hydrALAZINE 25 milliGRAM(s) Oral once      CURRENT OTHER MEDICATIONS:  LABS:	 	  ( 09 Mar 2022 18:25 )  Troponin T  0.06 ,  CPK  X    , CKMB  X    , BNP X        , ( 09 Mar 2022 14:42 )  Troponin T  0.08<H>,  CPK  538<H>, CKMB  X    , BNP 4882<H>                        13.9   3.87  )-----------( 216      ( 09 Mar 2022 14:42 )             45.1     03-09    139  |  102  |  28.3<H>  ----------------------------<  100<H>  5.2   |  26.0  |  2.05<H>    Ca    9.2      09 Mar 2022 14:42  Mg     2.2     03-09    TPro  8.8<H>  /  Alb  4.2  /  TBili  0.5  /  DBili  x   /  AST  43<H>  /  ALT  24  /  AlkPhos  97  03-09    PT/INR/PTT ( 09 Mar 2022 14:42 )                       :                       :      11.8         :       33.6                  .        .                   .              .           .       1.02        .                                       TELEMETRY: NSR 70s  ECG:    DIAGNOSTIC TESTING:  [ ] Echocardiogram:   < from: TTE Echo Complete w/ Contrast w/ Doppler (03.09.22 @ 18:57) >  PHYSICIAN INTERPRETATION:  Left Ventricle: The left ventricular internal cavity size is normal.  Global LV systolic function was mildly decreased. Left ventricular   ejection fraction, by visual estimation, is 40 to 45%. Spectral Doppler   shows pseudonormal pattern of leftventricular myocardial filling (Grade   II diastolic dysfunction).  Right Ventricle: The right ventricular size is normal. RV systolic   function is mildly reduced.  Left Atrium: Severely enlarged left atrium.  Right Atrium: Right atrial enlargement.  Pericardium: There is no evidence of pericardial effusion.  Mitral Valve: The mitral valve is normal in structure. Mild thickening of   the anterior mitral valve leaflet. Mobility is mildly decreased for the   posterior leaflet. Trace mitral valve regurgitation is seen.  Tricuspid Valve: The tricuspid valve is normal in structure. Trivial   tricuspid regurgitation is visualized. Adequate TR velocity was not   obtained to accurately assess RVSP.  Aortic Valve: The aortic valve is trileaflet. Sclerotic aortic valve with   normal opening. Peak transaortic gradient equals 3.2 mmHg, mean   transaortic gradient equals 1.1 mmHg, the calculated aortic valve area   equals 2.55 cm² by the continuity equation consistent with normally   opening aortic valve. Mild aortic valve regurgitation is seen.  Pulmonic Valve: The pulmonic valve was not well visualized.  Aorta: The aortic root is normal in size and structure.  Pulmonary Artery: The pulmonary artery is not well seen.  Venous: The inferior vena cava was normal sized, with respiratory size   variation greater than 50%.    Summary:   1. Severely enlarged left atrium.   2. There is severe concentric left ventricular hypertrophy.   3. Left ventricular ejection fraction, by visual estimation, is 40 to   45%. Grade II diastolic dysfunction.   4. Right atrial enlargement.   5. Mildly reduced RV systolic function.   6. Mild aortic regurgitation.   7. There is no evidence of pericardial effusion.   8. In presence of uncontrolled blood pressure, echo features are   suggestive of hypertensive heart disease.    MD Tonya, RPVI Electronically signed on 3/10/2022 at 3:58:03 PM    < end of copied text >    [ ]  Catheterization:  [ ] Stress Test:  PENDING READ  OTHER: 	      
Patient is a 61y old  Male who presents with a chief complaint of NSTEMI, Hypertensive urgency (13 Mar 2022 11:20)      INTERVAL HPI/OVERNIGHT EVENTS: seen and examined. NO complains. Awaiting to have cardiac cath today     MEDICATIONS  (STANDING):  amLODIPine   Tablet 10 milliGRAM(s) Oral daily  aspirin  chewable 81 milliGRAM(s) Oral daily  atorvastatin 20 milliGRAM(s) Oral at bedtime  carvedilol 3.125 milliGRAM(s) Oral every 12 hours  cloNIDine 0.1 milliGRAM(s) Oral two times a day  clopidogrel Tablet 75 milliGRAM(s) Oral daily  heparin  Infusion.  Unit(s)/Hr (8 mL/Hr) IV Continuous <Continuous>  hydrALAZINE 50 milliGRAM(s) Oral three times a day  isosorbide   mononitrate ER Tablet (IMDUR) 60 milliGRAM(s) Oral daily  melatonin 3 milliGRAM(s) Oral at bedtime    MEDICATIONS  (PRN):  heparin   Injectable 4100 Unit(s) IV Push every 6 hours PRN For aPTT less than 40  hydrALAZINE Injectable 10 milliGRAM(s) IV Push every 3 hours PRN SBP/DBP>180/95      Allergies    No Known Allergies    Intolerances        REVIEW OF SYSTEMS:  CONSTITUTIONAL: No fever, weight loss, or fatigue  RESPIRATORY: No cough, wheezing, chills or hemoptysis; No shortness of breath  CARDIOVASCULAR: No chest pain, palpitations, dizziness, or leg swelling  GASTROINTESTINAL: No abdominal or epigastric pain. No nausea, vomiting, or hematemesis; No diarrhea or constipation. No melena or hematochezia.  NEUROLOGICAL: No headaches, memory loss, loss of strength, numbness, or tremors  MUSCULOSKELETAL: No joint pain or swelling; No muscle, back, or extremity pain      Vital Signs Last 24 Hrs  T(C): 36.6 (14 Mar 2022 07:57), Max: 36.7 (13 Mar 2022 20:00)  T(F): 97.8 (14 Mar 2022 07:57), Max: 98.1 (13 Mar 2022 20:00)  HR: 66 (14 Mar 2022 07:57) (58 - 66)  BP: 151/85 (14 Mar 2022 07:57) (130/92 - 151/85)  BP(mean): 104 (14 Mar 2022 04:39) (98 - 108)  RR: 18 (14 Mar 2022 07:57) (17 - 18)  SpO2: 100% (14 Mar 2022 07:57) (97% - 100%)    PHYSICAL EXAM:  GENERAL: NAD,   HEAD:  Atraumatic, Normocephalic  EYES: EOMI, PERRLA, conjunctiva and sclera clear  NECK: Supple, No JVD, Normal thyroid  NERVOUS SYSTEM:  Alert & Oriented X3, No gross focal deficits  CHEST/LUNG: Clear to percussion bilaterally; No rales, rhonchi, wheezing, or rubs  HEART: Regular rate and rhythm; No murmurs, rubs, or gallops  ABDOMEN: Soft, Nontender, Nondistended; Bowel sounds present  EXTREMITIES:  No clubbing, cyanosis, or edema  SKIN: No rashes or lesions    LABS:                        9.8    3.49  )-----------( 164      ( 14 Mar 2022 07:03 )             31.1     03-13    146<H>  |  97<L>  |  27.6<H>  ----------------------------<  94  4.8   |  23.0  |  1.43<H>    Ca    7.8<L>      13 Mar 2022 09:19  Phos  3.5     03-13  Mg     2.0     03-13      PTT - ( 14 Mar 2022 07:04 )  PTT:72.2 sec    CAPILLARY BLOOD GLUCOSE          RADIOLOGY & ADDITIONAL TESTS:    Imaging Personally Reviewed:  [ ] YES  [ ] NO    Consultant(s) Notes Reviewed:  [ ] YES  [ ] NO    Care Discussed with Consultants/Other Providers [ ] YES  [ ] NO    Plan of Care discussed with Housestaff [ ]YES [ ] NO

## 2022-03-15 NOTE — PROGRESS NOTE ADULT - ATTENDING COMMENTS
-nonobstructive CAD with LAD 40% lesion- continue ASA/statin, LDL at goal  -NICM due to uncontrolled HTN and cocaine use- continue low dose carvedilol limited by HR, given risk for noncompliance would avoid using long term clonidine as rebound effect if skips med, add spironolactone 25mg and wean off clonidine, increase hydralazine to 100mg TID, continue Imdur and amlodipine; can consider outpatient addition of Entresto if Cr. continues to improve  -discharge planning   -patient is  non domicile, requesting  housing support  -strongly advised abstain from cocaine use        Dominguez Richardson DO, [facility]  Faculty Non-Invasive Cardiologist  [phone]
-on 1:1 constant observation, calm and cooperative  -reportedly had prior cardiac cath 1 year ago at [facility] told to be normal but explained that given NSTEMI and cardiomyopathy warrant for repeat LHC  -on clonidine, hydralazine, Imdur and amlodipine for BP control, renal insuff defer ARNI for now until repeat LHC  -cocaine positive would add carvedilol 3.125mg BID with NSVT  -euvolemic no need diuresis        Dominguez Richardson DO, [facility]  Faculty Non-Invasive Cardiologist  [phone]
60 y/o M presents with suicidal ideation. Was found to have elevated troponin.   +Recent cocaine and marijuana use. Active smoker, 10 cigs/day x 40 years.   EKG shows SR with anterolateral TWI, similar to previous EKG from 11/2020  TTE showed mildly decreased LVEF, possible hypertensive heart disease  NST today   SR with PVCs on telemetry   BP uncontrolled. Continue norvasc. Increase hydralazine to 50mg bid   Further management as above
60 y/o M presents with suicidal ideation. Was found to have elevated troponin.   Patient more awake this morning, but he is providing limited information  States he has a history of "heart problems" but is unable to elaborate. Denies CP or dyspnea. Unable to quantify effort tolerance.   +Recent cocaine and marijuana use. Active smoker, 10 cigs/day x 40 years.   EKG shows SR with anterolateral TWI, similar to previous EKG from 11/2020  Troponin downtrending, 0.08 -> 0.06 in the setting of renal insufficiency   TTE pending   Patient was ordered pharm NST; refused this AM. Agrees to undergo tomorrow.   SR with episodes of NSVT on monitor (longest 21 beats). No beta blocker in view of recent cocaine use. Continue to monitor.   BP uncontrolled. Continue norvasc. Start hydralazine 25mg bid and increase as needed.   Further management as above
-nonobstructive CAD with LAD 40% lesion- continue ASA/statin, LDL at goal  -NICM due to uncontrolled HTN and cocaine use- continue low dose carvedilol limited by HR, given risk for noncompliance would avoid using long term clonidine as rebound effect if skips med as well as hydralazine TID, Cr. stable likely with CKD, switch to Entresto 97/103, added spironolactone 25mg , continue Imdur and amlodipine; euvolemic not require diuretic  -discharge planning   -patient is  non domicile, requesting  housing support  -strongly advised abstain from cocaine use        Dominguez Richardson DO, [facility]  Faculty Non-Invasive Cardiologist  [phone] .

## 2022-03-15 NOTE — PROGRESS NOTE ADULT - PROBLEM SELECTOR PLAN 3
Ef 40-50% - on exam patient is euvolemic.    Also TN and cocaine use- continue low dose carvedilol limited by HR, given risk for noncompliance would avoid using long term clonidine as rebound effect if skips med,   Continue spironolactone 25mg and wean off clonidine,  Continue hydralazine to 100mg TID,    Continue Imdur and amlodipine;  Patient was started on Entresto - continue to monitor Cr.

## 2022-03-15 NOTE — PROVIDER CONTACT NOTE (OTHER) - ASSESSMENT
MT notified Rn that pt just had 6 beats of vtach on monitor, vitals taken and charted, pt denies chest pain and SOB, pt in NAD, pt complains of a headache rated 4 on numeric scale, MARILYNN Marmolejo made aware. ekg ordered. call ekg tech to have it performed.

## 2022-03-15 NOTE — PROGRESS NOTE ADULT - PROBLEM SELECTOR PROBLEM 2
Hypertensive urgency
Hypertensive urgency
Elevated troponin
Hypertensive urgency
Hypertensive urgency
Hypertension, unspecified type
Hypertensive urgency

## 2022-03-15 NOTE — PROGRESS NOTE ADULT - ASSESSMENT
61 year old male with PMH HTN, 'heart problems', Depression, polysubstance abuse presented to [facility] seeking help for his drug addiction and he is ready to stop. Also states he's depressed, though denies any suicidal or homicidal ideation. In ER patient with elevated BP >200/100, Positive Troponin, Elevated SCr and toxicology screen positive for cocaine and THC. TTE with decreased EF 40-45%, Grade II DD and decreased RVSF. NST also positive and patient now being recommended admission for cardiac catheterization.                  -nonobstructive CAD with LAD 40% lesion- continue ASA/statin, LDL    -discharge planning   -patient is  non domicile, requesting  housing support  -strongly advised abstain from cocaine use

## 2022-03-15 NOTE — PROGRESS NOTE ADULT - PROVIDER SPECIALTY LIST ADULT
Hospitalist
Cardiology
Intervent Cardiology
Cardiology
Hospitalist
Hospitalist
Cardiology
Hospitalist

## 2022-03-16 ENCOUNTER — TRANSCRIPTION ENCOUNTER (OUTPATIENT)
Age: 62
End: 2022-03-16

## 2022-03-16 VITALS
RESPIRATION RATE: 18 BRPM | SYSTOLIC BLOOD PRESSURE: 160 MMHG | DIASTOLIC BLOOD PRESSURE: 84 MMHG | HEART RATE: 74 BPM | TEMPERATURE: 98 F | OXYGEN SATURATION: 96 %

## 2022-03-16 LAB
HCT VFR BLD CALC: 35.7 % — LOW (ref 39–50)
HGB BLD-MCNC: 11.5 G/DL — LOW (ref 13–17)
MCHC RBC-ENTMCNC: 28.9 PG — SIGNIFICANT CHANGE UP (ref 27–34)
MCHC RBC-ENTMCNC: 32.2 GM/DL — SIGNIFICANT CHANGE UP (ref 32–36)
MCV RBC AUTO: 89.7 FL — SIGNIFICANT CHANGE UP (ref 80–100)
PLATELET # BLD AUTO: 175 K/UL — SIGNIFICANT CHANGE UP (ref 150–400)
RBC # BLD: 3.98 M/UL — LOW (ref 4.2–5.8)
RBC # FLD: 14.6 % — HIGH (ref 10.3–14.5)
WBC # BLD: 4.41 K/UL — SIGNIFICANT CHANGE UP (ref 3.8–10.5)
WBC # FLD AUTO: 4.41 K/UL — SIGNIFICANT CHANGE UP (ref 3.8–10.5)

## 2022-03-16 PROCEDURE — C1887: CPT

## 2022-03-16 PROCEDURE — 84484 ASSAY OF TROPONIN QUANT: CPT

## 2022-03-16 PROCEDURE — 82550 ASSAY OF CK (CPK): CPT

## 2022-03-16 PROCEDURE — 85027 COMPLETE CBC AUTOMATED: CPT

## 2022-03-16 PROCEDURE — 82553 CREATINE MB FRACTION: CPT

## 2022-03-16 PROCEDURE — 85610 PROTHROMBIN TIME: CPT

## 2022-03-16 PROCEDURE — 84100 ASSAY OF PHOSPHORUS: CPT

## 2022-03-16 PROCEDURE — C8929: CPT

## 2022-03-16 PROCEDURE — 76770 US EXAM ABDO BACK WALL COMP: CPT

## 2022-03-16 PROCEDURE — 96374 THER/PROPH/DIAG INJ IV PUSH: CPT

## 2022-03-16 PROCEDURE — C1769: CPT

## 2022-03-16 PROCEDURE — 86803 HEPATITIS C AB TEST: CPT

## 2022-03-16 PROCEDURE — 96376 TX/PRO/DX INJ SAME DRUG ADON: CPT

## 2022-03-16 PROCEDURE — 78452 HT MUSCLE IMAGE SPECT MULT: CPT

## 2022-03-16 PROCEDURE — 93017 CV STRESS TEST TRACING ONLY: CPT

## 2022-03-16 PROCEDURE — 93458 L HRT ARTERY/VENTRICLE ANGIO: CPT

## 2022-03-16 PROCEDURE — 71045 X-RAY EXAM CHEST 1 VIEW: CPT

## 2022-03-16 PROCEDURE — 99239 HOSP IP/OBS DSCHRG MGMT >30: CPT

## 2022-03-16 PROCEDURE — G0378: CPT

## 2022-03-16 PROCEDURE — U0003: CPT

## 2022-03-16 PROCEDURE — 85025 COMPLETE CBC W/AUTO DIFF WBC: CPT

## 2022-03-16 PROCEDURE — 80307 DRUG TEST PRSMV CHEM ANLYZR: CPT

## 2022-03-16 PROCEDURE — 80061 LIPID PANEL: CPT

## 2022-03-16 PROCEDURE — 80048 BASIC METABOLIC PNL TOTAL CA: CPT

## 2022-03-16 PROCEDURE — 83735 ASSAY OF MAGNESIUM: CPT

## 2022-03-16 PROCEDURE — 36415 COLL VENOUS BLD VENIPUNCTURE: CPT

## 2022-03-16 PROCEDURE — 83880 ASSAY OF NATRIURETIC PEPTIDE: CPT

## 2022-03-16 PROCEDURE — 87521 HEPATITIS C PROBE&RVRS TRNSC: CPT

## 2022-03-16 PROCEDURE — U0005: CPT

## 2022-03-16 PROCEDURE — 96361 HYDRATE IV INFUSION ADD-ON: CPT

## 2022-03-16 PROCEDURE — 96375 TX/PRO/DX INJ NEW DRUG ADDON: CPT

## 2022-03-16 PROCEDURE — 99285 EMERGENCY DEPT VISIT HI MDM: CPT | Mod: 25

## 2022-03-16 PROCEDURE — 99152 MOD SED SAME PHYS/QHP 5/>YRS: CPT

## 2022-03-16 PROCEDURE — C1894: CPT

## 2022-03-16 PROCEDURE — 81001 URINALYSIS AUTO W/SCOPE: CPT

## 2022-03-16 PROCEDURE — 85730 THROMBOPLASTIN TIME PARTIAL: CPT

## 2022-03-16 PROCEDURE — 93005 ELECTROCARDIOGRAM TRACING: CPT

## 2022-03-16 PROCEDURE — 80053 COMPREHEN METABOLIC PANEL: CPT

## 2022-03-16 PROCEDURE — 86703 HIV-1/HIV-2 1 RESULT ANTBDY: CPT

## 2022-03-16 PROCEDURE — A9500: CPT

## 2022-03-16 RX ORDER — ASPIRIN/CALCIUM CARB/MAGNESIUM 324 MG
1 TABLET ORAL
Qty: 0 | Refills: 0 | DISCHARGE
Start: 2022-03-16

## 2022-03-16 RX ORDER — AMLODIPINE BESYLATE 2.5 MG/1
1 TABLET ORAL
Qty: 0 | Refills: 0 | DISCHARGE
Start: 2022-03-16

## 2022-03-16 RX ORDER — ATORVASTATIN CALCIUM 80 MG/1
1 TABLET, FILM COATED ORAL
Qty: 0 | Refills: 0 | DISCHARGE
Start: 2022-03-16

## 2022-03-16 RX ORDER — ISOSORBIDE MONONITRATE 60 MG/1
1 TABLET, EXTENDED RELEASE ORAL
Qty: 0 | Refills: 0 | DISCHARGE
Start: 2022-03-16

## 2022-03-16 RX ORDER — SACUBITRIL AND VALSARTAN 24; 26 MG/1; MG/1
1 TABLET, FILM COATED ORAL
Qty: 0 | Refills: 0 | DISCHARGE
Start: 2022-03-16

## 2022-03-16 RX ORDER — CARVEDILOL PHOSPHATE 80 MG/1
1 CAPSULE, EXTENDED RELEASE ORAL
Qty: 0 | Refills: 0 | DISCHARGE
Start: 2022-03-16

## 2022-03-16 RX ORDER — SPIRONOLACTONE 25 MG/1
1 TABLET, FILM COATED ORAL
Qty: 0 | Refills: 0 | DISCHARGE
Start: 2022-03-16

## 2022-03-16 RX ADMIN — ISOSORBIDE MONONITRATE 60 MILLIGRAM(S): 60 TABLET, EXTENDED RELEASE ORAL at 12:05

## 2022-03-16 RX ADMIN — SPIRONOLACTONE 25 MILLIGRAM(S): 25 TABLET, FILM COATED ORAL at 05:15

## 2022-03-16 RX ADMIN — CARVEDILOL PHOSPHATE 3.12 MILLIGRAM(S): 80 CAPSULE, EXTENDED RELEASE ORAL at 05:15

## 2022-03-16 RX ADMIN — HEPARIN SODIUM 5000 UNIT(S): 5000 INJECTION INTRAVENOUS; SUBCUTANEOUS at 05:15

## 2022-03-16 RX ADMIN — AMLODIPINE BESYLATE 10 MILLIGRAM(S): 2.5 TABLET ORAL at 05:15

## 2022-03-16 RX ADMIN — SACUBITRIL AND VALSARTAN 1 TABLET(S): 24; 26 TABLET, FILM COATED ORAL at 05:15

## 2022-03-16 NOTE — DISCHARGE NOTE NURSING/CASE MANAGEMENT/SOCIAL WORK - PATIENT PORTAL LINK FT
You can access the FollowMyHealth Patient Portal offered by [facility] by registering at the following website: [email or web address]. By joining [facility]’s FollowMyHealth portal, you will also be able to view your health information using other applications (apps) compatible with our system.

## 2022-03-16 NOTE — DISCHARGE NOTE NURSING/CASE MANAGEMENT/SOCIAL WORK - NSDCPEFALRISK_GEN_ALL_CORE
For information on Fall & Injury Prevention, visit: [email or web address] OR  [email or web address] OR  https://www.cdc.gov/steadi/patient.html

## 2022-03-16 NOTE — DISCHARGE NOTE NURSING/CASE MANAGEMENT/SOCIAL WORK - NSDCPEEMAIL_GEN_ALL_CORE
[facility] for Tobacco Control email [email or web address]

## 2022-03-16 NOTE — DISCHARGE NOTE NURSING/CASE MANAGEMENT/SOCIAL WORK - NSDCPEWEB_GEN_ALL_CORE
[facility] for Tobacco Control website --- [email or web address] website --- [email or web address]

## 2022-03-16 NOTE — DISCHARGE NOTE PROVIDER - NSDCCPCAREPLAN_GEN_ALL_CORE_FT
PRINCIPAL DISCHARGE DIAGNOSIS  Diagnosis: NSTEMI (non-ST elevation myocardial infarction)  Assessment and Plan of Treatment: you were found to have a NSTEMI during this hospitalization. Our cardiology team evaluated you and you had a cardiac cath done, no intervention was performed and you will be managed medically. please continue Aspirin, coreg, amlodipine, hydralazine and isordil. you were started on entresto. please follow up with Cardiology upon discharge.      SECONDARY DISCHARGE DIAGNOSES  Diagnosis: Intoxication with cocaine  Assessment and Plan of Treatment: you well be going to a substance abuse rehab    Diagnosis: Acute kidney injury superimposed on CKD  Assessment and Plan of Treatment: resolved    Diagnosis: Depression with suicidal ideation  Assessment and Plan of Treatment: you were seen by our behavioral health team and they did not recommend an inpatient psych admission at this time.

## 2022-03-16 NOTE — DISCHARGE NOTE PROVIDER - CARE PROVIDER_API CALL
Dominguez Richardson Can (DO)  Cardiovascular Disease; Internal Medicine  [address] [place] [address]  Phone: [phone]  Fax: [phone]  Follow Up Time:     PCP,   Phone: (   )    -  Fax: (   )    -  Follow Up Time:

## 2022-03-16 NOTE — DISCHARGE NOTE PROVIDER - HOSPITAL COURSE
61 year old male with history of HTN, Depression, polysubstance abuse presented to [facility] seeking help for his drug addiction. In ED, Patient noted with elevated SBP 200s, Positive Troponin, Elevated SCr and toxicology screen positive for cocaine and THC. Pateint was admitted for NSTEMI. TTE with decreased EF 40-45%, Grade II DD and decreased RVSF. NST also positive. Patient under went cardiac cath 3/14 which was noted for Nonobstructive LAD stenosis. Patient to placed on GDMT per cardiology. patient started on entresto this admission. Patient is medically ready for discharge. Patient requesting substance rehab.. SW and CM aware and will help facilitate discharge to rehab center.

## 2022-03-16 NOTE — DISCHARGE NOTE PROVIDER - NSDCMRMEDTOKEN_GEN_ALL_CORE_FT
amLODIPine 10 mg oral tablet: 1 tab(s) orally once a day  aspirin 81 mg oral tablet, chewable: 1 tab(s) orally once a day  atorvastatin 20 mg oral tablet: 1 tab(s) orally once a day (at bedtime)  carvedilol 3.125 mg oral tablet: 1 tab(s) orally every 12 hours  isosorbide mononitrate 60 mg oral tablet, extended release: 1 tab(s) orally once a day  sacubitril-valsartan 97 mg-103 mg oral tablet: 1 tab(s) orally 2 times a day  spironolactone 25 mg oral tablet: 1 tab(s) orally once a day

## 2022-03-17 LAB — HCV RNA FLD QL NAA+PROBE: SIGNIFICANT CHANGE UP

## 2022-03-21 NOTE — CDI QUERY NOTE - NSCDIOTHERTXTBX_GEN_ALL_CORE_HH
Use of “in setting of” only describe that two conditions co-exist and does not provide the cause and effect relationship between two conditions, can you please clarify if there’s a link between NSTEMI and cocaine abuse?  A.	NSTEMI due to cocaine abuse  B.	NSTEMI not related to cocaine abuse  C.	Other, please specify  D.	Not clinically significant      Supporting Documentations:      Progress Note Adult-Hospitalist Attending [Charted Location: [address] [address] [address]] [Authored: 15-Mar-2022 14:16]  1. NSTEMI, in setting of crack cocaine abuse with Hypertensive urgency  S/p cardiac cath. Found to have non obstructive LAD stenosis   c/w Aspirin,   d/c plavix, Heparin full dose, clonidine   c/w Carvedilol started on 3/12, c/w  Amlodipine, Hydralazine and Nitrate      Discharge Note Provider [[address]] [Authored: 16-Mar-2022 10:26]  Hospital Course	  61 year old male with history of HTN, Depression, polysubstance abuse presented to [facility] seeking help for his drug addiction. In ED, Patient noted with elevated SBP 200s, Positive Troponin, Elevated SCr and toxicology screen positive for cocaine and THC. Pateint was admitted for NSTEMI.    PRINCIPAL DISCHARGE DIAGNOSIS  Diagnosis: NSTEMI (non-ST elevation myocardial infarction)

## 2022-03-25 NOTE — ED STATDOCS - MDM ORDERS SUBMITTED SELECTION
normal appearance , without tenderness upon palpation , no deformities , trachea midline , Thyroid normal size , no masses
EKG/Labs

## 2023-08-02 NOTE — CONSULT NOTE ADULT - CONSULT REASON
[facility])  Rx #: [id number]  Brittany Srivastava FlexTouch (insulin degludec injection) 100 Units/mL solution       Form  [employer] Prior Authorization Request Form
Abnormal EKG

## 2024-05-16 NOTE — ED ADULT NURSE NOTE - SUICIDE SCREENING DEPRESSION
Dr. Estevez please advise     Patient returning call   Rectal bleeding  When did the symptoms start? Long time 3 years, the most recent 2 months ago and last for 4 weeks   Do you have any chest pain? No    Any of the following present? tiredness   -light-headedness, dizziness, or fainting:   -vomiting blood or coffee grounds-like emesis:  -intermittent abdominal pain:  -frequent black tarry stools:  -large amount of bright-red blood mixed in the stool or passing of blood clots: 2 weeks ago some blood clots all sizes   4. Have you had a recent rectal surgery? No   5. Do you have an injury to your rectal area? No   6. Do you have any rectal pain? No   7. Are you taking any narcotic medications? No   8. Do you have a fever or chills? No   9. Do you have nausea or vomiting? No   9. When was the last time you passed any stool? This morning, normal, last bowel with blood two weeks ago   10. Any abdominal pain or pain with stools? No   11. Are the stools hard or dry? No   12. Is there pressure in the rectum or the urge to pass a stool but you are unable to pass the stool? No   13. Have you tried any OTC laxatives or stool softeners? No      Last labs completed 5/13/24  Per patient hx. Hemorrhoids   Denies any current symptoms   OV 6/19/24 with VB  Referral from Dr. Daniels for Anemia and rectal bleed     Poonam Tineo RN     Negative Positive

## 2024-07-15 NOTE — ED ADULT TRIAGE NOTE - SPO2 (%)
Scheduled procedure with Patient at    Telephone Information:   Mobile [phone]        Scheduled Via: Case Request Order for  [facility]    Did patient request a different provider then the referred to:No    Procedure date: 3/5/2025     Procedure time: 1400 hospital prep ; Did patient request this specific time? No    -If a MAC pt is scheduled, pt was notified a family member/friend  will  need to stay with the patient over night  once they return home after their procedure: Yes    Was patients demographics verified, (address,phone number email)  Yes    Insurance name confirmed as aarp, will be the same at time of procedure?: Yes  Prep required? Yes, Pharmacy is [facility] prep ; was request sent to nurse to send prep to pharmacy? No;     Was prep instructions briefly reviewed? Yes;     If there was a med hold in the chart review .was the patient made aware of the med hold?  Yes    Was patient told to contact us back if prep instructions not received in 7 business days?  Yes    If procedure is scheduled 7 days or less, patient was told to  prep letter?: No   98

## 2024-09-04 NOTE — PROGRESS NOTE ADULT - PROBLEM SELECTOR PLAN 1
Procedure: Left heart catheterization  1. S/P LHC: Nonobstructive LAD stenosis  Wrist precautions explained.  GDMT:        DAPT: D/C Plavix, continue aspirin 81 mg daily.       Statin: Lipitor 20 mg daily -  at goal       Beta Blocker: Continue Coreg 3.125 mg BID       Other Antianginals: Imdur 60 mg daily, amlodipine 10 mg daily       Aggressive cardiovascular risk reduction and lifestyle modification.  Discontinue heparin drip  Creatinine with improvement noted.    cardiac rehab noted for outpatient
.  - SBP today 200  - Continue amlodipine 10mg PO daily  - increase hydralazine to 50 mg PO BID, if still hypertensive can increase as needed  - HCTZ on hold secondary to elevated Cr  - repeat labs now BMP, Mg  - BB deferred secondary to cocaine use
- -200's  - Continue amlodipine 10mg PO daily  - Start hydralazine 25mg PO BID, if still hypertensive can increase to 50mg PO BID  - HCTZ on hold secondary to elevated Cr   - Pt came to ED yesterday with cocaine intoxication- BB deferred
Please call your doctor if you have fever or chills within the next 48 hours.